# Patient Record
Sex: FEMALE | Race: BLACK OR AFRICAN AMERICAN | Employment: UNEMPLOYED | URBAN - METROPOLITAN AREA
[De-identification: names, ages, dates, MRNs, and addresses within clinical notes are randomized per-mention and may not be internally consistent; named-entity substitution may affect disease eponyms.]

---

## 2017-04-13 ENCOUNTER — HOSPITAL ENCOUNTER (OUTPATIENT)
Age: 37
Setting detail: OBSERVATION
Discharge: HOME | End: 2017-04-14
Attending: EMERGENCY MEDICINE | Admitting: HOSPITALIST
Payer: MEDICAID

## 2017-04-13 ENCOUNTER — APPOINTMENT (OUTPATIENT)
Dept: CT IMAGING | Age: 37
End: 2017-04-13
Attending: HOSPITALIST
Payer: MEDICAID

## 2017-04-13 ENCOUNTER — APPOINTMENT (OUTPATIENT)
Dept: GENERAL RADIOLOGY | Age: 37
End: 2017-04-13
Attending: EMERGENCY MEDICINE
Payer: MEDICAID

## 2017-04-13 DIAGNOSIS — R94.31 ABNORMAL ECG: ICD-10-CM

## 2017-04-13 DIAGNOSIS — R07.89 CHEST WALL PAIN: ICD-10-CM

## 2017-04-13 DIAGNOSIS — R07.9 CHEST PAIN, UNSPECIFIED TYPE: Primary | ICD-10-CM

## 2017-04-13 DIAGNOSIS — M79.662 BILATERAL CALF PAIN: ICD-10-CM

## 2017-04-13 DIAGNOSIS — M79.661 BILATERAL CALF PAIN: ICD-10-CM

## 2017-04-13 PROBLEM — E11.638: Status: ACTIVE | Noted: 2017-04-13

## 2017-04-13 PROBLEM — I10 HTN (HYPERTENSION): Status: ACTIVE | Noted: 2017-04-13

## 2017-04-13 PROBLEM — K21.9 ACID REFLUX DISEASE: Status: ACTIVE | Noted: 2017-04-13

## 2017-04-13 LAB
ALBUMIN SERPL BCP-MCNC: 3.7 G/DL (ref 3.4–5)
ALBUMIN/GLOB SERPL: 0.9 {RATIO} (ref 0.8–1.7)
ALP SERPL-CCNC: 73 U/L (ref 45–117)
ALT SERPL-CCNC: 36 U/L (ref 13–56)
ANION GAP BLD CALC-SCNC: 10 MMOL/L (ref 3–18)
AST SERPL W P-5'-P-CCNC: 20 U/L (ref 15–37)
BASOPHILS # BLD AUTO: 0 K/UL (ref 0–0.06)
BASOPHILS # BLD: 0 % (ref 0–2)
BILIRUB SERPL-MCNC: 0.4 MG/DL (ref 0.2–1)
BUN SERPL-MCNC: 6 MG/DL (ref 7–18)
BUN/CREAT SERPL: 7 (ref 12–20)
CALCIUM SERPL-MCNC: 9.7 MG/DL (ref 8.5–10.1)
CHLORIDE SERPL-SCNC: 101 MMOL/L (ref 100–108)
CK MB CFR SERPL CALC: NORMAL % (ref 0–4)
CK MB CFR SERPL CALC: NORMAL % (ref 0–4)
CK MB SERPL-MCNC: <1 NG/ML (ref 5–25)
CK MB SERPL-MCNC: <1 NG/ML (ref 5–25)
CK SERPL-CCNC: 103 U/L (ref 26–192)
CK SERPL-CCNC: 113 U/L (ref 26–192)
CO2 SERPL-SCNC: 29 MMOL/L (ref 21–32)
CREAT SERPL-MCNC: 0.81 MG/DL (ref 0.6–1.3)
D DIMER PPP FEU-MCNC: <0.27 UG/ML(FEU)
DIFFERENTIAL METHOD BLD: ABNORMAL
EOSINOPHIL # BLD: 0.3 K/UL (ref 0–0.4)
EOSINOPHIL NFR BLD: 3 % (ref 0–5)
ERYTHROCYTE [DISTWIDTH] IN BLOOD BY AUTOMATED COUNT: 14.2 % (ref 11.6–14.5)
EST. AVERAGE GLUCOSE BLD GHB EST-MCNC: 151 MG/DL
GLOBULIN SER CALC-MCNC: 4.3 G/DL (ref 2–4)
GLUCOSE BLD STRIP.AUTO-MCNC: 129 MG/DL (ref 70–110)
GLUCOSE BLD STRIP.AUTO-MCNC: 150 MG/DL (ref 70–110)
GLUCOSE SERPL-MCNC: 140 MG/DL (ref 74–99)
HBA1C MFR BLD: 6.9 % (ref 4.5–5.6)
HCG SERPL QL: NEGATIVE
HCT VFR BLD AUTO: 35.8 % (ref 35–45)
HGB BLD-MCNC: 12.1 G/DL (ref 12–16)
LIPASE SERPL-CCNC: 218 U/L (ref 73–393)
LYMPHOCYTES # BLD AUTO: 17 % (ref 21–52)
LYMPHOCYTES # BLD: 1.6 K/UL (ref 0.9–3.6)
MCH RBC QN AUTO: 28.1 PG (ref 24–34)
MCHC RBC AUTO-ENTMCNC: 33.8 G/DL (ref 31–37)
MCV RBC AUTO: 83.3 FL (ref 74–97)
MONOCYTES # BLD: 0.3 K/UL (ref 0.05–1.2)
MONOCYTES NFR BLD AUTO: 3 % (ref 3–10)
NEUTS SEG # BLD: 7.5 K/UL (ref 1.8–8)
NEUTS SEG NFR BLD AUTO: 77 % (ref 40–73)
PLATELET # BLD AUTO: 334 K/UL (ref 135–420)
PMV BLD AUTO: 8.9 FL (ref 9.2–11.8)
POTASSIUM SERPL-SCNC: 3.7 MMOL/L (ref 3.5–5.5)
PROT SERPL-MCNC: 8 G/DL (ref 6.4–8.2)
RBC # BLD AUTO: 4.3 M/UL (ref 4.2–5.3)
SODIUM SERPL-SCNC: 140 MMOL/L (ref 136–145)
TROPONIN I BLD-MCNC: <0.04 NG/ML (ref 0–0.08)
TROPONIN I SERPL-MCNC: <0.02 NG/ML (ref 0–0.06)
TROPONIN I SERPL-MCNC: <0.02 NG/ML (ref 0–0.06)
WBC # BLD AUTO: 9.8 K/UL (ref 4.6–13.2)

## 2017-04-13 PROCEDURE — 71020 XR CHEST PA LAT: CPT

## 2017-04-13 PROCEDURE — 99285 EMERGENCY DEPT VISIT HI MDM: CPT

## 2017-04-13 PROCEDURE — 82962 GLUCOSE BLOOD TEST: CPT

## 2017-04-13 PROCEDURE — 84703 CHORIONIC GONADOTROPIN ASSAY: CPT | Performed by: EMERGENCY MEDICINE

## 2017-04-13 PROCEDURE — 85379 FIBRIN DEGRADATION QUANT: CPT | Performed by: EMERGENCY MEDICINE

## 2017-04-13 PROCEDURE — 74011250637 HC RX REV CODE- 250/637: Performed by: EMERGENCY MEDICINE

## 2017-04-13 PROCEDURE — 74011250636 HC RX REV CODE- 250/636: Performed by: EMERGENCY MEDICINE

## 2017-04-13 PROCEDURE — 74011250637 HC RX REV CODE- 250/637: Performed by: HOSPITALIST

## 2017-04-13 PROCEDURE — 83690 ASSAY OF LIPASE: CPT | Performed by: EMERGENCY MEDICINE

## 2017-04-13 PROCEDURE — 83036 HEMOGLOBIN GLYCOSYLATED A1C: CPT | Performed by: HOSPITALIST

## 2017-04-13 PROCEDURE — 93970 EXTREMITY STUDY: CPT

## 2017-04-13 PROCEDURE — 93005 ELECTROCARDIOGRAM TRACING: CPT

## 2017-04-13 PROCEDURE — 82550 ASSAY OF CK (CPK): CPT | Performed by: EMERGENCY MEDICINE

## 2017-04-13 PROCEDURE — 85025 COMPLETE CBC W/AUTO DIFF WBC: CPT | Performed by: EMERGENCY MEDICINE

## 2017-04-13 PROCEDURE — 96374 THER/PROPH/DIAG INJ IV PUSH: CPT

## 2017-04-13 PROCEDURE — 96372 THER/PROPH/DIAG INJ SC/IM: CPT

## 2017-04-13 PROCEDURE — 74011250637 HC RX REV CODE- 250/637: Performed by: INTERNAL MEDICINE

## 2017-04-13 PROCEDURE — 96376 TX/PRO/DX INJ SAME DRUG ADON: CPT

## 2017-04-13 PROCEDURE — 96361 HYDRATE IV INFUSION ADD-ON: CPT

## 2017-04-13 PROCEDURE — 74011636320 HC RX REV CODE- 636/320: Performed by: EMERGENCY MEDICINE

## 2017-04-13 PROCEDURE — 84484 ASSAY OF TROPONIN QUANT: CPT | Performed by: EMERGENCY MEDICINE

## 2017-04-13 PROCEDURE — 99218 HC RM OBSERVATION: CPT

## 2017-04-13 PROCEDURE — 96360 HYDRATION IV INFUSION INIT: CPT

## 2017-04-13 PROCEDURE — 74011250636 HC RX REV CODE- 250/636: Performed by: HOSPITALIST

## 2017-04-13 PROCEDURE — 80053 COMPREHEN METABOLIC PANEL: CPT | Performed by: EMERGENCY MEDICINE

## 2017-04-13 PROCEDURE — 96375 TX/PRO/DX INJ NEW DRUG ADDON: CPT

## 2017-04-13 PROCEDURE — 71275 CT ANGIOGRAPHY CHEST: CPT

## 2017-04-13 PROCEDURE — 36415 COLL VENOUS BLD VENIPUNCTURE: CPT | Performed by: HOSPITALIST

## 2017-04-13 PROCEDURE — 93017 CV STRESS TEST TRACING ONLY: CPT | Performed by: HOSPITALIST

## 2017-04-13 RX ORDER — GLIPIZIDE 10 MG/1
10 TABLET ORAL 2 TIMES DAILY
COMMUNITY

## 2017-04-13 RX ORDER — HYDROCODONE BITARTRATE AND ACETAMINOPHEN 5; 325 MG/1; MG/1
1 TABLET ORAL
Status: COMPLETED | OUTPATIENT
Start: 2017-04-13 | End: 2017-04-13

## 2017-04-13 RX ORDER — PANTOPRAZOLE SODIUM 20 MG/1
20 TABLET, DELAYED RELEASE ORAL DAILY
COMMUNITY
End: 2017-06-16

## 2017-04-13 RX ORDER — MORPHINE SULFATE 2 MG/ML
1 INJECTION, SOLUTION INTRAMUSCULAR; INTRAVENOUS
Status: DISCONTINUED | OUTPATIENT
Start: 2017-04-13 | End: 2017-04-14 | Stop reason: HOSPADM

## 2017-04-13 RX ORDER — SODIUM CHLORIDE 0.9 % (FLUSH) 0.9 %
5-10 SYRINGE (ML) INJECTION AS NEEDED
Status: DISCONTINUED | OUTPATIENT
Start: 2017-04-13 | End: 2017-04-14 | Stop reason: HOSPADM

## 2017-04-13 RX ORDER — SODIUM CHLORIDE 9 MG/ML
75 INJECTION, SOLUTION INTRAVENOUS CONTINUOUS
Status: DISCONTINUED | OUTPATIENT
Start: 2017-04-13 | End: 2017-04-14 | Stop reason: HOSPADM

## 2017-04-13 RX ORDER — ACETAMINOPHEN 325 MG/1
650 TABLET ORAL ONCE
Status: COMPLETED | OUTPATIENT
Start: 2017-04-13 | End: 2017-04-13

## 2017-04-13 RX ORDER — DEXTROSE 50 % IN WATER (D50W) INTRAVENOUS SYRINGE
25-50 AS NEEDED
Status: DISCONTINUED | OUTPATIENT
Start: 2017-04-13 | End: 2017-04-14 | Stop reason: HOSPADM

## 2017-04-13 RX ORDER — LOSARTAN POTASSIUM 25 MG/1
25 TABLET ORAL DAILY
Status: DISCONTINUED | OUTPATIENT
Start: 2017-04-14 | End: 2017-04-14 | Stop reason: HOSPADM

## 2017-04-13 RX ORDER — MAGNESIUM SULFATE 100 %
4 CRYSTALS MISCELLANEOUS AS NEEDED
Status: DISCONTINUED | OUTPATIENT
Start: 2017-04-13 | End: 2017-04-14 | Stop reason: HOSPADM

## 2017-04-13 RX ORDER — NITROGLYCERIN 0.4 MG/1
0.4 TABLET SUBLINGUAL AS NEEDED
Status: DISCONTINUED | OUTPATIENT
Start: 2017-04-13 | End: 2017-04-14 | Stop reason: HOSPADM

## 2017-04-13 RX ORDER — INSULIN LISPRO 100 [IU]/ML
INJECTION, SOLUTION INTRAVENOUS; SUBCUTANEOUS
Status: DISCONTINUED | OUTPATIENT
Start: 2017-04-13 | End: 2017-04-14 | Stop reason: HOSPADM

## 2017-04-13 RX ORDER — GUAIFENESIN 100 MG/5ML
324 LIQUID (ML) ORAL
Status: COMPLETED | OUTPATIENT
Start: 2017-04-13 | End: 2017-04-13

## 2017-04-13 RX ORDER — DICYCLOMINE HYDROCHLORIDE 10 MG/1
10 CAPSULE ORAL
COMMUNITY
End: 2017-10-09

## 2017-04-13 RX ORDER — HYDROCODONE BITARTRATE AND ACETAMINOPHEN 5; 325 MG/1; MG/1
1 TABLET ORAL
Qty: 16 TAB | Refills: 0 | Status: SHIPPED | OUTPATIENT
Start: 2017-04-13 | End: 2017-10-09

## 2017-04-13 RX ORDER — SODIUM CHLORIDE 0.9 % (FLUSH) 0.9 %
5-10 SYRINGE (ML) INJECTION EVERY 8 HOURS
Status: DISCONTINUED | OUTPATIENT
Start: 2017-04-13 | End: 2017-04-14 | Stop reason: HOSPADM

## 2017-04-13 RX ORDER — METOPROLOL TARTRATE 25 MG/1
25 TABLET, FILM COATED ORAL 2 TIMES DAILY
Status: DISCONTINUED | OUTPATIENT
Start: 2017-04-14 | End: 2017-04-13

## 2017-04-13 RX ORDER — PANTOPRAZOLE SODIUM 40 MG/1
40 TABLET, DELAYED RELEASE ORAL
Status: DISCONTINUED | OUTPATIENT
Start: 2017-04-13 | End: 2017-04-14 | Stop reason: HOSPADM

## 2017-04-13 RX ORDER — METOPROLOL TARTRATE 25 MG/1
25 TABLET, FILM COATED ORAL 2 TIMES DAILY
Status: DISCONTINUED | OUTPATIENT
Start: 2017-04-13 | End: 2017-04-14 | Stop reason: HOSPADM

## 2017-04-13 RX ORDER — KETOROLAC TROMETHAMINE 30 MG/ML
30 INJECTION, SOLUTION INTRAMUSCULAR; INTRAVENOUS
Status: COMPLETED | OUTPATIENT
Start: 2017-04-13 | End: 2017-04-13

## 2017-04-13 RX ORDER — ENOXAPARIN SODIUM 150 MG/ML
140 INJECTION SUBCUTANEOUS EVERY 12 HOURS
Status: DISCONTINUED | OUTPATIENT
Start: 2017-04-13 | End: 2017-04-14

## 2017-04-13 RX ORDER — GUAIFENESIN 100 MG/5ML
81 LIQUID (ML) ORAL DAILY
Status: DISCONTINUED | OUTPATIENT
Start: 2017-04-14 | End: 2017-04-14 | Stop reason: HOSPADM

## 2017-04-13 RX ADMIN — Medication 1 MG: at 22:57

## 2017-04-13 RX ADMIN — SODIUM CHLORIDE 1000 ML: 900 INJECTION, SOLUTION INTRAVENOUS at 12:41

## 2017-04-13 RX ADMIN — PANTOPRAZOLE SODIUM 40 MG: 40 TABLET, DELAYED RELEASE ORAL at 18:32

## 2017-04-13 RX ADMIN — Medication 10 ML: at 21:13

## 2017-04-13 RX ADMIN — Medication 10 ML: at 14:00

## 2017-04-13 RX ADMIN — KETOROLAC TROMETHAMINE 30 MG: 30 INJECTION, SOLUTION INTRAMUSCULAR at 12:39

## 2017-04-13 RX ADMIN — ENOXAPARIN SODIUM 140 MG: 150 INJECTION SUBCUTANEOUS at 17:49

## 2017-04-13 RX ADMIN — ACETAMINOPHEN 650 MG: 325 TABLET, FILM COATED ORAL at 21:47

## 2017-04-13 RX ADMIN — ASPIRIN 324 MG: 81 TABLET, CHEWABLE ORAL at 16:40

## 2017-04-13 RX ADMIN — Medication 1 MG: at 19:04

## 2017-04-13 RX ADMIN — IOPAMIDOL 100 ML: 755 INJECTION, SOLUTION INTRAVENOUS at 18:21

## 2017-04-13 RX ADMIN — METOPROLOL TARTRATE 25 MG: 25 TABLET ORAL at 22:53

## 2017-04-13 RX ADMIN — HYDROCODONE BITARTRATE AND ACETAMINOPHEN 1 TABLET: 5; 325 TABLET ORAL at 14:36

## 2017-04-13 RX ADMIN — NITROGLYCERIN 1 INCH: 20 OINTMENT TOPICAL at 16:40

## 2017-04-13 RX ADMIN — SODIUM CHLORIDE 75 ML/HR: 900 INJECTION, SOLUTION INTRAVENOUS at 19:04

## 2017-04-13 NOTE — PROGRESS NOTES
1845 Pt arrived on unit from the ED via stretcher. Pt is alert and oriented x4, denies chest pain/SOB, VSS. Pt has been oriented to the room, bed is in the low and locked position, call bell has been left within reach, and pt denies any further needs at this time.

## 2017-04-13 NOTE — ROUTINE PROCESS
Bedside and Verbal shift change report given to MICH Rai (oncoming nurse) by Sis Olivo   (offgoing nurse).  Report included the following information SBAR, Kardex, ED Summary, Procedure Summary, Intake/Output, MAR, Recent Results and Cardiac Rhythm SR.

## 2017-04-13 NOTE — ED PROVIDER NOTES
[address]  EMERGENCY DEPARTMENT HISTORY AND PHYSICAL EXAM       Date: 4/13/2017   Patient Name: Bart Coleman   YOB: 1980  Medical Record Number: [id number]    History of Presenting Illness     Chief Complaint   Patient presents with    Chest Pain    Shortness of Breath    Leg Pain        History Provided By:  Patient    Additional History:   12:10 PM  Bart Coleman is a 31 y.o. female H x DM and HTN who presents to the emergency department C/O central chest pain onset ~4 days ago. Pain rated 10/10. Pt reports it is a constant \"throbbing\" pain and that the pain is exacerbated by movement. It does not radiate anywhere else. Associated Sx include SOB, bilateral calf tenderness, and cough. Denies recent travels, Etoh use, or tobacco use. Denies abdominal pain, ear pain, eye pain, sore throat, and any other Sx or complaints at this time. LMP 3/29/17. Primary Care Provider: None   Specialist:    Past History     Past Medical History:   Past Medical History:   Diagnosis Date    Diabetes ([address]     GERD (gastroesophageal reflux disease)     High cholesterol     Pancreatitis         Past Surgical History:   Past Surgical History:   Procedure Laterality Date    HX APPENDECTOMY      HX CHOLECYSTECTOMY          Family History:   History reviewed. No pertinent family history. Social History:   Social History   Substance Use Topics    Smoking status: Never Smoker    Smokeless tobacco: None    Alcohol use No        Allergies: Allergies   Allergen Reactions    Effexor [Venlafaxine] Rash        Review of Systems   Review of Systems   Constitutional: Negative for fatigue and fever. HENT: Negative for ear pain, rhinorrhea and sore throat. Eyes: Negative for pain. Respiratory: Positive for cough and shortness of breath. Cardiovascular: Positive for chest pain. Negative for palpitations.    Gastrointestinal: Negative for abdominal pain, diarrhea, nausea and vomiting. Genitourinary: Negative for difficulty urinating and dysuria. Musculoskeletal: Negative for arthralgias and myalgias. Bilateral calf tenderness   Skin: Negative for color change and rash. Neurological: Negative for light-headedness and headaches. All other systems reviewed and are negative. Physical Exam  Vitals:    04/13/17 1036 04/13/17 1158 04/13/17 1346 04/13/17 1645   BP: 152/90  135/67 (!) 152/96   Pulse: 96  89 96   Resp: 14  16 15   Temp: 98.4 °F (36.9 °C)  98.7 °F (37.1 °C) 98.5 °F (36.9 °C)   SpO2: 100% 100% 95% 100%   Weight: 134.7 kg (297 lb)      Height: 5' 7\" (1.702 m)          Physical Exam   Constitutional: She is oriented to person, place, and time. Morbidly obese. HENT:   Head: Normocephalic and atraumatic. Right Ear: External ear normal.   Left Ear: External ear normal.   Nose: Nose normal.   Mouth/Throat: Oropharynx is clear and moist.   Eyes: Conjunctivae and EOM are normal. Pupils are equal, round, and reactive to light. Neck: Normal range of motion. Neck supple. No JVD present. No tracheal deviation present. Cardiovascular: Normal rate, regular rhythm, normal heart sounds and intact distal pulses. Exam reveals no gallop and no friction rub. No murmur heard. Anterior chest wall tenderness    Pulmonary/Chest: Effort normal and breath sounds normal. No respiratory distress. She has no wheezes. She has no rales. She exhibits tenderness. Abdominal: Soft. Bowel sounds are normal. She exhibits no distension and no mass. There is no tenderness. There is no rebound and no guarding. Musculoskeletal: Normal range of motion. She exhibits tenderness. She exhibits no edema. Right lower leg: She exhibits tenderness. Left lower leg: She exhibits tenderness. Neurological: She is alert and oriented to person, place, and time. She has normal reflexes. No cranial nerve deficit. Skin: Skin is warm and dry. No rash noted.    Psychiatric: She has a normal mood and affect. Her behavior is normal.   Nursing note and vitals reviewed. Diagnostic Study Results     Labs -      Recent Results (from the past 12 hour(s))   EKG, 12 LEAD, INITIAL    Collection Time: 04/13/17 11:06 AM   Result Value Ref Range    Ventricular Rate 103 BPM    Atrial Rate 103 BPM    P-R Interval 140 ms    QRS Duration 74 ms    Q-T Interval 376 ms    QTC Calculation (Bezet) 492 ms    Calculated P Axis 54 degrees    Calculated R Axis 34 degrees    Calculated T Axis -14 degrees    Diagnosis       Sinus tachycardia  T wave abnormality, consider anterolateral ischemia  Abnormal ECG  No previous ECGs available     CBC WITH AUTOMATED DIFF    Collection Time: 04/13/17 12:09 PM   Result Value Ref Range    WBC 9.8 4.6 - 13.2 K/uL    RBC 4.30 4.20 - 5.30 M/uL    HGB 12.1 12.0 - 16.0 g/dL    HCT 35.8 35.0 - 45.0 %    MCV 83.3 74.0 - 97.0 FL    MCH 28.1 24.0 - 34.0 PG    MCHC 33.8 31.0 - 37.0 g/dL    RDW 14.2 11.6 - 14.5 %    PLATELET 527 703 - 341 K/uL    MPV 8.9 (L) 9.2 - 11.8 FL    NEUTROPHILS 77 (H) 40 - 73 %    LYMPHOCYTES 17 (L) 21 - 52 %    MONOCYTES 3 3 - 10 %    EOSINOPHILS 3 0 - 5 %    BASOPHILS 0 0 - 2 %    ABS. NEUTROPHILS 7.5 1.8 - 8.0 K/UL    ABS. LYMPHOCYTES 1.6 0.9 - 3.6 K/UL    ABS. MONOCYTES 0.3 0.05 - 1.2 K/UL    ABS. EOSINOPHILS 0.3 0.0 - 0.4 K/UL    ABS.  BASOPHILS 0.0 0.0 - 0.06 K/UL    DF AUTOMATED     METABOLIC PANEL, COMPREHENSIVE    Collection Time: 04/13/17 12:09 PM   Result Value Ref Range    Sodium 140 136 - 145 mmol/L    Potassium 3.7 3.5 - 5.5 mmol/L    Chloride 101 100 - 108 mmol/L    CO2 29 21 - 32 mmol/L    Anion gap 10 3.0 - 18 mmol/L    Glucose 140 (H) 74 - 99 mg/dL    BUN 6 (L) 7.0 - 18 MG/DL    Creatinine 0.81 0.6 - 1.3 MG/DL    BUN/Creatinine ratio 7 (L) 12 - 20      GFR est AA >60 >60 ml/min/1.73m2    GFR est non-AA >60 >60 ml/min/1.73m2    Calcium 9.7 8.5 - 10.1 MG/DL    Bilirubin, total 0.4 0.2 - 1.0 MG/DL    ALT (SGPT) 36 13 - 56 U/L    AST (SGOT) 20 15 - 37 U/L    Alk. phosphatase 73 45 - 117 U/L    Protein, total 8.0 6.4 - 8.2 g/dL    Albumin 3.7 3.4 - 5.0 g/dL    Globulin 4.3 (H) 2.0 - 4.0 g/dL    A-G Ratio 0.9 0.8 - 1.7     HCG QL SERUM    Collection Time: 04/13/17 12:09 PM   Result Value Ref Range    HCG, Ql. NEGATIVE  NEG     D DIMER    Collection Time: 04/13/17 12:09 PM   Result Value Ref Range    D DIMER <0.27 <0.46 ug/ml(FEU)   CARDIAC PANEL,(CK, CKMB & TROPONIN)    Collection Time: 04/13/17 12:09 PM   Result Value Ref Range     26 - 192 U/L    CK - MB <1.0 <3.6 ng/ml    CK-MB Index Cannot be calulated 0.0 - 4.0 %    Troponin-I, Qt. <0.02 0.00 - 0.06 NG/ML   LIPASE    Collection Time: 04/13/17 12:09 PM   Result Value Ref Range    Lipase 218 73 - 393 U/L   POC TROPONIN-I    Collection Time: 04/13/17  3:27 PM   Result Value Ref Range    Troponin-I (POC) <0.04 0.00 - 0.08 ng/mL   EKG, 12 LEAD, INITIAL    Collection Time: 04/13/17  3:31 PM   Result Value Ref Range    Ventricular Rate 91 BPM    Atrial Rate 91 BPM    P-R Interval 140 ms    QRS Duration 86 ms    Q-T Interval 438 ms    QTC Calculation (Bezet) 538 ms    Calculated P Axis 46 degrees    Calculated R Axis 28 degrees    Calculated T Axis -11 degrees    Diagnosis       Normal sinus rhythm  Nonspecific T wave abnormality  Prolonged QT  Abnormal ECG  When compared with ECG of 13-APR-2017 11:06,  Nonspecific T wave abnormality has replaced inverted T waves in Anterior   leads         Radiologic Studies -  The following have been ordered and reviewed:  DUPLEX LOWER EXT VENOUS BILAT:  No evidence of bilateral lower extremity deep venous thrombosis or  significant venous valvular incompetence  As read by the radiologist.         XR CHEST PA LAT   Final Result:  No significant abnormality  As read by the radiologist.          Medical Decision Making   I am the first provider for this patient.      I reviewed the vital signs, available nursing notes, past medical history, past surgical history, family history and social history. INITIAL CLINICAL IMPRESSION and PLANS:  DDx: PE, DVT, ACS, CHF, chest wall pain, biliary colic, pancreatitis     Considering the above, my initial management plan to evaluate and therapeutic interventions include the following and as noted in the orders:    1. Labs: CBC, CMP, UA, HCG QL, D-Dimer, cardiac panel, lipase,   2. Imaging: EKG, CXR      Vital Signs-Reviewed the patient's vital signs. Patient Vitals for the past 12 hrs:   Temp Pulse Resp BP SpO2   04/13/17 1645 98.5 °F (36.9 °C) 96 15 (!) 152/96 100 %   04/13/17 1346 98.7 °F (37.1 °C) 89 16 135/67 95 %   04/13/17 1158 - - - - 100 %   04/13/17 1036 98.4 °F (36.9 °C) 96 14 152/90 100 %       Pulse Oximetry Analysis - Normal 100% on room air     Cardiac Monitor:   Rate: 95 bpm  Rhythm: SV Rhythm     EKG interpretation: (Preliminary)  Sinus tachycardia. Rate 103 bpm. T wave abnormality, consider anterolateral ischemia. EKG read by Chelsea Brice MD at 11:06 AM     EKG interpretation: (Secondary)  NSR. Rate 91 bpm. No specific T wave changes. EKG read by Lucian Mendez MD at 3:31 PM       Procedures:   Procedures    ED Course: The patient had persistent chest pain in the ED. ECG showed T wave changes. Serial troponins were normal. D-dimer normal, doubt PE. BLE PVL showed no DVT. Labs remarkable for hyperglycemia. The patient's chest pain resolved for 45 minutes after dose of Norco, but returned. Case discussed with Cardiology who recommends admission for further evaluation. Case discussed with hospitalist who agrees with admission. 12:10 PM  Initial assessment performed. The patients presenting problems have been discussed, and they are in agreement with the care plan formulated and outlined with them. I have encouraged them to ask questions as they arise throughout their visit.       PROGRESS NOTE:   4:25 PM  Pt has been re-examined by Hilda Borrego MD. She was chest pain free from 3:30 to 4:15PM but the pain is now coming back. Written by Mitesh Randall, ED Scribe, as dictated by Shanice Mena MD.    Medications Given in the ED:  Medications   sodium chloride (NS) flush 5-10 mL (10 mL IntraVENous Given 4/13/17 1400)   sodium chloride (NS) flush 5-10 mL (not administered)   GI COCKTAIL [facility]) (30 mL Oral Refused 4/13/17 1453)   sodium chloride 0.9 % bolus infusion 1,000 mL (0 mL IntraVENous IV Completed 4/13/17 1533)   ketorolac (TORADOL) injection 30 mg (30 mg IntraVENous Given 4/13/17 1239)   HYDROcodone-acetaminophen (NORCO) 5-325 mg per tablet 1 Tab (1 Tab Oral Given 4/13/17 1436)   aspirin chewable tablet 324 mg (324 mg Oral Given 4/13/17 1640)   nitroglycerin (NITROBID) 2 % ointment 1 Inch (1 Inch Topical Given 4/13/17 1640)     CONSULT NOTE:   4:33 PM  Shanice Mena MD spoke with Ese Rosales MD   Specialty: Hospitalist  Discussed pt's hx, disposition, and available diagnostic and imaging results. Reviewed care plans. Consulting physician agrees with plans as outlined. She will admit the patient to Telemetry. Written by Mitesh Randall, ED Scribe, as dictated by Shanice Mena MD.     4:34 PM  Patient is being admitted to the hospital by Ese Rosales MD. The results of their tests and reasons for their admission have been discussed with them and/or available family. They convey agreement and understanding for the need to be admitted and for their admission diagnosis. CONDITIONS ON ADMISSION:  Deep Vein Thrombosis is not present at the time of admission. Thrombosis is not present at the time of admission. Urinary Tract Infection is not present at the time of admission. Pneumonia is not present at the time of admission. MRSA is not present at the time of admission. Wound infection is not present at the time of admission. Pressure Ulcer is not present at the time of admission. Diagnosis   Clinical Impression:   1. Chest pain, unspecified type    2. Chest wall pain    3. Bilateral calf pain    4.  Abnormal ECG         _______________________________   Attestations: This note is prepared by Marisol Mcgee, acting as a Scribe for Lucian Mendez MD on 12:07 PM on 4/13/2017 . Lucian Mendez MD: The scribe's documentation has been prepared under my direction and personally reviewed by me in its entirety.   _______________________________

## 2017-04-13 NOTE — ED TRIAGE NOTES
I think I might have chest pain, and shortness of breath. Have pain and swelling in my calf of both of my legs. i think I might have blood clots in my chest and in my legs. Sepsis Screening completed    (  )Patient meets SIRS criteria. (x  )Patient does not meet SIRS criteria.       SIRS Criteria is achieved when two or more of the following are present   Temperature < 96.8°F (36°C) or > 100.9°F (38.3°C)   Heart Rate > 90 beats per minute   Respiratory Rate > 20 breaths per minute   WBC count > 12,000 or <4,000 or > 10% bands

## 2017-04-13 NOTE — CONSULTS
[address] [place] [address] Rd    Name:  Jag Gonzalez  MR#:  [id number]  :  1980  Account #:  [de-identified]  Date of Adm:  2017  Date of Consultation:  2017      REASON FOR CONSULTATION: Chest pain. HISTORY OF PRESENT ILLNESS: The patient is a 19-year-old very  pleasant female with morbid obesity, diabetes, hypertension,  gastroesophageal reflux disease, came to the hospital with on and off  chest pain started from last , becomes more during the  exertion, throbbing pain in character, without any palpitation, sometimes  feels better on bending forward, become worse on exertion and lying. She had similar kind of symptoms in the past and she reported she  had a stress test done in the past that was normal. Denied any fever,  cough, or pleuritic chest pain. Denied any new swelling in the lower  extremities and denied any previous history of heart problem. There is  no history of premature coronary artery disease in the family as well. PAST MEDICAL HISTORY:  1. Diabetes. 2. Obesity. 3. Hypertension. 4. Pancreatitis history. 5. Dyslipidemia. SURGICAL HISTORY: Significant for appendectomy and  cholecystectomy. MEDICATIONS: Reviewed and discussed with the patient. ALLERGIES: THE PATIENT IS ALLERGIC TO VENLAFAXINE. REVIEW OF SYSTEMS  A 10-point review of systems completed. Positive pertinent findings  discussed in history of present illness. The rest of the systems are  negative. PHYSICAL EXAMINATION  GENERAL: At the time of consultation: Afebrile, temperature is 98.5,  pulse rate is 96, heart rate is 15, blood pressure is 152/96, pulse  oximetry 100%. HEENT: Head is atraumatic, normocephalic. NECK: Supple. No JVD, no carotid bruits. HEART: S1, S2 audible. LUNGS: Bilateral air entry positive, no added sound. ABDOMEN: Soft, nontender. Bowel sounds audible. EXTREMITIES: No edema. Pulses are palpable.   NEUROLOGIC: No focal motor or sensory deficit. DERMATOLOGIC: No skin rash. LABORATORY DATA: EKG has shown minimal ST depression with T-  wave inversion in V3 to V6, and also in V3 and V5 as well. The repeat  EKG has shown a little improvement in the T-wave inversion in the  anterior leads. Hemoglobin is 12.1, platelet count is 935. Sodium 140, potassium 3.7,  troponin is 0.02 and CK-MB is normal.    ASSESSMENT AND PLAN:  1. Chest pain with risk factors. 2. Mild dynamic T-wave changes. 3. Diabetes. 4. Hypertension. 5. Morbid obesity. Plan is to do the echocardiogram and a stress test tomorrow. Continue  the risk factor management. Treatment plan was discussed with the  patient and also with Dr. Flaquito Hutson as well.         Camacho Crowley MD MA / Ulysses Dress  D:  04/13/2017   18:18  T:  04/13/2017   18:41  Job #:  [id number]

## 2017-04-13 NOTE — PROCEDURES
[facility]  *** FINAL REPORT ***    Name: Julia Jade  MRN: [id number]    Outpatient  : 1980  HIS Order #: [id number]  [address] [facility] Visit #: [id number]  Date: 2017    TYPE OF TEST: Peripheral Venous Testing    REASON FOR TEST  Limb swelling    Right Leg:-  Deep venous thrombosis:           No  Superficial venous thrombosis:    No  Deep venous insufficiency:        Not examined  Superficial venous insufficiency: Not examined    Left Leg:-  Deep venous thrombosis:           No  Superficial venous thrombosis:    No  Deep venous insufficiency:        Not examined  Superficial venous insufficiency: Not examined      INTERPRETATION/FINDINGS  Duplex images were obtained using 2-D gray scale, color flow, and  spectral Doppler analysis. Bilateral lower extremity venous Doppler exam revealed patent vessels  with normal spontaneity and phasicity of signals with normal  augmentation. Deep venous valves appear competent. Duplex imaging revealed no intraluminal thrombus of the lower  extremities. IMPRESSION:  No evidence of bilateral lower extremity deep venous thrombosis or  significant venous valvular incompetence. ADDITIONAL COMMENTS    I have personally reviewed the data relevant to the interpretation of  this  study. TECHNOLOGIST: Xavier Sims  Signed: 2017 03:14 PM    PHYSICIAN: Christopher Salazar.  Anshu Cazares MD  Signed: 2017 11:03 AM

## 2017-04-13 NOTE — IP AVS SNAPSHOT
Greg Whitlock 
 
 
 [address] [facility] [address] 
[phone] Patient: Amy Hicks MRN: [id number] :1980 You are allergic to the following Allergen Reactions Effexor (Venlafaxine) Rash Recent Documentation Height Weight BMI OB Status Smoking Status 1.702 m 135.2 kg 46.67 kg/m2 Having regular periods Never Smoker Emergency Contacts Name Discharge Info Relation Home Work Mobile Humphrey Rojas DISCHARGE CAREGIVER [3] Spouse [3] [phone] About your hospitalization You were admitted on:  2017 You last received care in the:  [address] [place] [address] You were discharged on:  2017 Unit phone number:  [phone] Why you were hospitalized Your primary diagnosis was:  Chest Pain Your diagnoses also included:  Bilateral Calf Pain, Controlled Type 2 Diabetes Mellitus With Oral Complication (Hcc), Acid Reflux Disease, Htn (Hypertension) Providers Seen During Your Hospitalizations Provider Role Specialty Primary office phone Deven Rivers MD Attending Provider Emergency Medicine [phone] Yazan Vick MD Attending Provider Internal Medicine [phone] Your Primary Care Physician (PCP) Primary Care Physician Office Phone Office Fax Shyann Lee [phone] Follow-up Information Follow up With Details Comments Contact Info Ld Green DO Schedule an appointment as soon as possible for a visit in 1 day For further evaluation [address] [place] [address] [address] [place] [address] 
[phone] Stacia Garcia MD Schedule an appointment as soon as possible for a visit in 1 day For further evaluation of your chest pain [address] [facility] [address] [facility] [address] 
[phone] [facility] EMERGENCY DEPT  As needed, If symptoms worsen [address] [address] [phone] Carmen Chau MD In 1 week  [address] [address] 
[phone] Current Discharge Medication List  
  
START taking these medications Dose & Instructions Dispensing Information Comments Morning Noon Evening Bedtime  
 aspirin 81 mg chewable tablet Your next dose is:  04/15/17 Dose:  81 mg Take 1 Tab by mouth daily. Quantity:  30 Tab Refills:  0  
     
   
   
   
  
 metoprolol tartrate 25 mg tablet Commonly known as:  LOPRESSOR Your next dose is:  04/14/17 Dose:  25 mg Take 1 Tab by mouth two (2) times a day. Quantity:  60 Tab Refills:  0 CONTINUE these medications which have CHANGED Dose & Instructions Dispensing Information Comments Morning Noon Evening Bedtime HYDROcodone-acetaminophen 5-325 mg per tablet Commonly known as:  Viridiana Ly What changed:   
- how much to take - when to take this 
- reasons to take this Dose:  1 Tab Take 1 Tab by mouth every six (6) hours as needed for Pain. Max Daily Amount: 4 Tabs. Quantity:  16 Tab Refills:  0  
     
   
   
   
  
 losartan 25 mg tablet Commonly known as:  COZAAR What changed:   
- medication strength 
- how much to take - when to take this Your next dose is:  04/15/16 Dose:  25 mg Take 1 Tab by mouth daily. Quantity:  30 Tab Refills:  0 CONTINUE these medications which have NOT CHANGED Dose & Instructions Dispensing Information Comments Morning Noon Evening Bedtime BENTYL 10 mg capsule Generic drug:  dicyclomine Your next dose is:  04/14/17 Dose:  10 mg Take 10 mg by mouth 4 times daily (before meals and nightly). Refills:  0  
     
   
   
   
  
 glipiZIDE 10 mg tablet Commonly known as:  Patti Looney Your next dose is:  04/14/17 Dose:  10 mg Take 10 mg by mouth two (2) times a day. Refills:  0 MIRALAX 17 gram packet Generic drug:  polyethylene glycol Dose:  17 g Take 17 g by mouth daily. Refills:  0  
     
   
   
   
  
 pantoprazole 20 mg tablet Commonly known as:  PROTONIX Your next dose is:  04/15/17 Dose:  20 mg Take 20 mg by mouth daily. Refills:  0 PHENERGAN PO Take  by mouth. Refills:  0 PREVACID PO Your next dose is:  04/15/17 Take  by mouth. Refills:  0 STOP taking these medications PERCOCET 5-325 mg per tablet Generic drug:  oxyCODONE-acetaminophen Where to Get Your Medications Information on where to get these meds will be given to you by the nurse or doctor. ! Ask your nurse or doctor about these medications  
  aspirin 81 mg chewable tablet HYDROcodone-acetaminophen 5-325 mg per tablet  
 losartan 25 mg tablet  
 metoprolol tartrate 25 mg tablet Discharge Instructions Chest Pain: Care Instructions Your Care Instructions There are many things that can cause chest pain. Some are not serious and will get better on their own in a few days. But some kinds of chest pain need more testing and treatment. Your doctor may have recommended a follow-up visit in the next 8 to 12 hours. If you are not getting better, you may need more tests or treatment. Even though your doctor has released you, you still need to watch for any problems. The doctor carefully checked you, but sometimes problems can develop later. If you have new symptoms or if your symptoms do not get better, get medical care right away. If you have worse or different chest pain or pressure that lasts more than 5 minutes or you passed out (lost consciousness), call 911 or seek other emergency help right away. A medical visit is only one step in your treatment.  Even if you feel better, you still need to do what your doctor recommends, such as going to all suggested follow-up appointments and taking medicines exactly as directed. This will help you recover and help prevent future problems. How can you care for yourself at home? · Rest until you feel better. · Take your medicine exactly as prescribed. Call your doctor if you think you are having a problem with your medicine. · Do not drive after taking a prescription pain medicine. When should you call for help? Call 911 if: 
· You passed out (lost consciousness). · You have severe difficulty breathing. · You have symptoms of a heart attack. These may include: ¨ Chest pain or pressure, or a strange feeling in your chest. 
¨ Sweating. ¨ Shortness of breath. ¨ Nausea or vomiting. ¨ Pain, pressure, or a strange feeling in your back, neck, jaw, or upper belly or in one or both shoulders or arms. ¨ Lightheadedness or sudden weakness. ¨ A fast or irregular heartbeat. After you call 911, the  may tell you to chew 1 adult-strength or 2 to 4 low-dose aspirin. Wait for an ambulance. Do not try to drive yourself. Call your doctor today if: 
· You have any trouble breathing. · Your chest pain gets worse. · You are dizzy or lightheaded, or you feel like you may faint. · You are not getting better as expected. · You are having new or different chest pain. Where can you learn more? Go to http://bambi-carmelina.info/. Enter A120 in the search box to learn more about \"Chest Pain: Care Instructions. \" Current as of: May 27, 2016 Content Version: 11.2 © 0730-9599 [employer]. Care instructions adapted under license by [employer] (which disclaims liability or warranty for this information). If you have questions about a medical condition or this instruction, always ask your healthcare professional. [place] [address] any warranty or liability for your use of this information. DISCHARGE SUMMARY from Nurse The following personal items are in your possession at time of discharge: 
 
  
Visual Aid: Glasses, With patient Clothing: Footwear, Pants, Shirt (purse, flip flops) PATIENT INSTRUCTIONS: 
 
 
F-face looks uneven A-arms unable to move or move unevenly S-speech slurred or non-existent T-time-call 911 as soon as signs and symptoms begin-DO NOT go Back to bed or wait to see if you get better-TIME IS BRAIN. Warning Signs of HEART ATTACK Call 911 if you have these symptoms: 
? Chest discomfort. Most heart attacks involve discomfort in the center of the chest that lasts more than a few minutes, or that goes away and comes back. It can feel like uncomfortable pressure, squeezing, fullness, or pain. ? Discomfort in other areas of the upper body. Symptoms can include pain or discomfort in one or both arms, the back, neck, jaw, or stomach. ? Shortness of breath with or without chest discomfort. ? Other signs may include breaking out in a cold sweat, nausea, or lightheadedness. Don't wait more than five minutes to call [address]! Fast action can save your life. Calling 911 is almost always the fastest way to get lifesaving treatment. Emergency Medical Services staff can begin treatment when they arrive  up to an hour sooner than if someone gets to the hospital by car. The discharge information has been reviewed with the patient. The patient verbalized understanding. Discharge medications reviewed with the patient and appropriate educational materials and side effects teaching were provided. Patient armband removed and shredded Discharge Orders None MyChart Announcement  We are excited to announce that we are making your provider's discharge notes available to you in Lopoly. You will see these notes when they are completed and signed by the physician that discharged you from your recent hospital stay. If you have any questions or concerns about any information you see in Lopoly, please call the Health Information Department where you were seen or reach out to your Primary Care Provider for more information about your plan of care. Introducing [facility] SERVICES! Stephanie John introduces Lopoly patient portal. Now you can access parts of your medical record, email your doctor's office, and request medication refills online. 1. In your internet browser, go to [email or web address] 2. Click on the First Time User? Click Here link in the Sign In box. You will see the New Member Sign Up page. 3. Enter your Lopoly Access Code exactly as it appears below. You will not need to use this code after youve completed the sign-up process. If you do not sign up before the expiration date, you must request a new code. · Lopoly Access Code: [email or web address] Expires: 7/12/2017 11:18 AM 
 
4. Enter the last four digits of your Social Security Number (xxxx) and Date of Birth (mm/dd/yyyy) as indicated and click Submit. You will be taken to the next sign-up page. 5. Create a AfterColleget ID. This will be your Lopoly login ID and cannot be changed, so think of one that is secure and easy to remember. 6. Create a Lopoly password. You can change your password at any time. 7. Enter your Password Reset Question and Answer. This can be used at a later time if you forget your password. 8. Enter your e-mail address. You will receive e-mail notification when new information is available in [address] 9. Click Sign Up. You can now view and download portions of your medical record. 10. Click the Download Summary menu link to download a portable copy of your medical information. If you have questions, please visit the Frequently Asked Questions section of the MyChart website. Remember, MyChart is NOT to be used for urgent needs. For medical emergencies, dial 911. Now available from your iPhone and Android! General Information Please provide this summary of care documentation to your next provider. Patient Signature:  ____________________________________________________________ Date:  ____________________________________________________________  
  
Lajuanda Erika Provider Signature:  ____________________________________________________________ Date:  ____________________________________________________________

## 2017-04-13 NOTE — H&P
History & Physical    Patient: Elizabeth Allen MRN: [id number]  CSN: [id number]    YOB: 1980  Age: 31 y.o. Sex: female      DOA: 4/13/2017  Primary Care Provider:  None      Assessment/Plan     Patient Active Problem List   Diagnosis Code    Chest pain R07.9    Bilateral calf pain M79.661, M79.662    Controlled type 2 diabetes mellitus with oral complication (HCC) V73.951    Acid reflux disease K21.9    HTN (hypertension) I10       Admit to tele for obs     1. Chest pain ,   Will have cta to r/O PE, ce trend to r/o acs.  echo , morphine , nitro, lovenox therapeutic dose till r/o above . Stress test   Gi cocktail in ed,   uds   2. gerd    ppi ,      3 DM type II ,    ssi, diabetic diet , hypoglycemia protocol      4 HTN, accelerated  Continue home medication. 5 morbid obesity      Full code     DVT : lovenox. ppi proph  CC: chest pain        HPI:     Elizabeth Allen is a 31 y.o. female who hx of  DM , HTN ,gerd came to ed due to chest pain since last Saturday-on and off, epigastric area radiated to left chest. Associated sob. No palpitation. Throbbing pain, sitting straight better, lying and exertion worsening. Had similar episodes several years ago with negative stress test.  Also reported pain in bilateral calf pain since Saturday. ekg changed     Denies any slurred speech/headache/n/v/blurred vission/d/c/palpitation/gait change/bleeding. Denies smoking/ any alcohol or drug use. She was given aspirin and nitro paste in ED. Pt still has on and off chest pain when she was seen in ED.    Visit Vitals    BP (!) 152/96 (BP 1 Location: Right arm, BP Patient Position: At rest)    Pulse 96    Temp 98.5 °F (36.9 °C)    Resp 15    Ht 5' 7\" (1.702 m)    Wt 134.7 kg (297 lb)    LMP 03/29/2017    SpO2 100%    BMI 46.52 kg/m2      O2 Device: Room air      Past Medical History:   Diagnosis Date    Diabetes ([address]     GERD (gastroesophageal reflux disease)     High cholesterol     Pancreatitis        Past Surgical History:   Procedure Laterality Date    HX APPENDECTOMY      HX CHOLECYSTECTOMY         History reviewed. No pertinent family history. Social History     Social History    Marital status:      Spouse name: N/A    Number of children: N/A    Years of education: N/A     Social History Main Topics    Smoking status: Never Smoker    Smokeless tobacco: None    Alcohol use No    Drug use: None    Sexual activity: Not Asked     Other Topics Concern    None     Social History Narrative       Prior to Admission medications    Medication Sig Start Date End Date Taking? Authorizing Provider   glipiZIDE (GLUCOTROL) 10 mg tablet Take 10 mg by mouth two (2) times a day. Yes Shaan St MD   LOSARTAN PO Take  by mouth. Yes Shaan St MD   pantoprazole (PROTONIX) 20 mg tablet Take 20 mg by mouth daily. Yes Shaan St MD   dicyclomine (BENTYL) 10 mg capsule Take 10 mg by mouth 4 times daily (before meals and nightly). Yes Shaan St MD   HYDROcodone-acetaminophen (NORCO) 5-325 mg per tablet Take 1 Tab by mouth every six (6) hours as needed for Pain. Max Daily Amount: 4 Tabs. 4/13/17  Yes Balbir Nava MD   LANSOPRAZOLE (PREVACID PO) Take  by mouth. Shaan St MD   PROMETHAZINE HCL (PHENERGAN PO) Take  by mouth. Shaan St MD   oxycodone-acetaminophen (PERCOCET) 5-325 mg per tablet Take 1 tablet by mouth every four (4) hours as needed for Pain. Shaan St MD   hydrocodone-acetaminophen (NORCO) 5-325 mg per tablet Take  by mouth. Shaan St MD   polyethylene glycol (MIRALAX) 17 gram packet Take 17 g by mouth daily. Shaan St MD       Allergies   Allergen Reactions    Effexor [Venlafaxine] Rash       Review of Systems  Gen: No fever, chills, malaise, weight loss/gain. Heent: No headache, rhinorrhea, epistaxis, ear pain, hearing loss, sinus pain, neck pain/stiffness, sore throat. Heart: chest pain, no  palpitations, SLOAN, pnd, or orthopnea. Resp: No cough, hemoptysis, wheezing , + shortness of breath. GI: No nausea, vomiting, diarrhea, constipation, melena or hematochezia. : No urinary obstruction, dysuria or hematuria. Derm: No rash, new skin lesion or pruritis. Musc/skeletal: no bone or joint complains. Bilateral calf pain and leg swelling. Vasc: No edema, cyanosis or claudication. Endo: No heat/cold intolerance, no polyuria,polydipsia or polyphagia. Neuro: No unilateral weakness, numbness, tingling. No seizures. Heme: No easy bruising or bleeding. Physical Exam:     Physical Exam:  Visit Vitals    BP (!) 152/96 (BP 1 Location: Right arm, BP Patient Position: At rest)    Pulse 96    Temp 98.5 °F (36.9 °C)    Resp 15    Ht 5' 7\" (1.702 m)    Wt 134.7 kg (297 lb)    LMP 2017    SpO2 100%    BMI 46.52 kg/m2      O2 Device: Room air    Temp (24hrs), Av.5 °F (36.9 °C), Min:98.4 °F (36.9 °C), Max:98.7 °F (37.1 °C)             General:  Awake, cooperative, no distress. Head:  Normocephalic, without obvious abnormality, atraumatic. Eyes:  Conjunctivae/corneas clear, sclera anicteric, PERRL, EOMs intact. Nose: Nares normal. No drainage or sinus tenderness. Throat: Lips, mucosa, and tongue normal. .   Neck: Supple, symmetrical, trachea midline, no adenopathy. Lungs:   Clear to auscultation bilaterally. Tenderness of left chest wall        Heart:  Regular rate and rhythm, S1, S2 normal, no murmur, click, rub or gallop. Abdomen: Soft, mild tender on epigastric area . Bowel sounds normal. No masses,  No organomegaly. Extremities: Extremities normal, atraumatic, no cyanosis or edema. Pulses: 2+ and symmetric all extremities. Skin: Skin color-pink, texture, turgor normal. No rashes or lesions. Capillary refill normal    Neurologic: CNII-XII intact. No focal motor or sensory deficit.        Labs Reviewed:    BMP:   Lab Results   Component Value Date/Time     2017 12:09 PM    K 3.7 04/13/2017 12:09 PM     04/13/2017 12:09 PM    CO2 29 04/13/2017 12:09 PM    AGAP 10 04/13/2017 12:09 PM     (H) 04/13/2017 12:09 PM    BUN 6 (L) 04/13/2017 12:09 PM    CREA 0.81 04/13/2017 12:09 PM    GFRAA >60 04/13/2017 12:09 PM    GFRNA >60 04/13/2017 12:09 PM     CMP:   Lab Results   Component Value Date/Time     04/13/2017 12:09 PM    K 3.7 04/13/2017 12:09 PM     04/13/2017 12:09 PM    CO2 29 04/13/2017 12:09 PM    AGAP 10 04/13/2017 12:09 PM     (H) 04/13/2017 12:09 PM    BUN 6 (L) 04/13/2017 12:09 PM    CREA 0.81 04/13/2017 12:09 PM    GFRAA >60 04/13/2017 12:09 PM    GFRNA >60 04/13/2017 12:09 PM    CA 9.7 04/13/2017 12:09 PM    ALB 3.7 04/13/2017 12:09 PM    TP 8.0 04/13/2017 12:09 PM    GLOB 4.3 (H) 04/13/2017 12:09 PM    AGRAT 0.9 04/13/2017 12:09 PM    SGOT 20 04/13/2017 12:09 PM    ALT 36 04/13/2017 12:09 PM     CBC:   Lab Results   Component Value Date/Time    WBC 9.8 04/13/2017 12:09 PM    HGB 12.1 04/13/2017 12:09 PM    HCT 35.8 04/13/2017 12:09 PM     04/13/2017 12:09 PM     All Cardiac Markers in the last 24 hours:   Lab Results   Component Value Date/Time     04/13/2017 12:09 PM    CKMB <1.0 04/13/2017 12:09 PM    CKND1 Cannot be calulated 04/13/2017 12:09 PM    TROIQ <0.02 04/13/2017 12:09 PM    TNIPOC <0.04 04/13/2017 03:27 PM     Recent Glucose Results:   Lab Results   Component Value Date/Time     (H) 04/13/2017 12:09 PM     ABG: No results found for: PH, PHI, PCO2, PCO2I, PO2, PO2I, HCO3, HCO3I, FIO2, FIO2I  COAGS: No results found for: APTT, PTP, INR  Liver Panel:   Lab Results   Component Value Date/Time    ALB 3.7 04/13/2017 12:09 PM    TP 8.0 04/13/2017 12:09 PM    GLOB 4.3 (H) 04/13/2017 12:09 PM    AGRAT 0.9 04/13/2017 12:09 PM    SGOT 20 04/13/2017 12:09 PM    ALT 36 04/13/2017 12:09 PM    AP 73 04/13/2017 12:09 PM     Pancreatic Markers:   Lab Results   Component Value Date/Time    LPSE 218 04/13/2017 12:09 PM Procedures/imaging: see electronic medical records for all procedures/Xrays and details which were not copied into this note but were reviewed prior to creation of Latha Carrillo MD, Internal Medicine     CC: None

## 2017-04-13 NOTE — ED NOTES
TRANSFER - OUT REPORT:    Verbal report given to [facility] RN(name) on Shay Jacobhan  being transferred to tele(unit) for routine progression of care       Report consisted of patients Situation, Background, Assessment and   Recommendations(SBAR). Information from the following report(s) SBAR, Kardex and ED Summary was reviewed with the receiving nurse. Lines:   Peripheral IV 04/13/17 Left Antecubital (Active)        Opportunity for questions and clarification was provided.       Patient transported with:   Registered Nurse  Tech

## 2017-04-14 ENCOUNTER — APPOINTMENT (OUTPATIENT)
Dept: NUCLEAR MEDICINE | Age: 37
End: 2017-04-14
Attending: HOSPITALIST
Payer: MEDICAID

## 2017-04-14 VITALS
HEIGHT: 67 IN | DIASTOLIC BLOOD PRESSURE: 65 MMHG | RESPIRATION RATE: 16 BRPM | WEIGHT: 293 LBS | OXYGEN SATURATION: 100 % | SYSTOLIC BLOOD PRESSURE: 118 MMHG | HEART RATE: 72 BPM | BODY MASS INDEX: 45.99 KG/M2 | TEMPERATURE: 98 F

## 2017-04-14 LAB
CK MB CFR SERPL CALC: NORMAL % (ref 0–4)
CK MB CFR SERPL CALC: NORMAL % (ref 0–4)
CK MB SERPL-MCNC: <1 NG/ML (ref 5–25)
CK MB SERPL-MCNC: <1 NG/ML (ref 5–25)
CK SERPL-CCNC: 118 U/L (ref 26–192)
CK SERPL-CCNC: 123 U/L (ref 26–192)
GLUCOSE BLD STRIP.AUTO-MCNC: 123 MG/DL (ref 70–110)
GLUCOSE BLD STRIP.AUTO-MCNC: 129 MG/DL (ref 70–110)
TROPONIN I SERPL-MCNC: <0.02 NG/ML (ref 0–0.06)
TROPONIN I SERPL-MCNC: <0.02 NG/ML (ref 0–0.06)

## 2017-04-14 PROCEDURE — 36415 COLL VENOUS BLD VENIPUNCTURE: CPT | Performed by: HOSPITALIST

## 2017-04-14 PROCEDURE — 74011250637 HC RX REV CODE- 250/637: Performed by: HOSPITALIST

## 2017-04-14 PROCEDURE — 99218 HC RM OBSERVATION: CPT

## 2017-04-14 PROCEDURE — 82962 GLUCOSE BLOOD TEST: CPT

## 2017-04-14 PROCEDURE — 96375 TX/PRO/DX INJ NEW DRUG ADDON: CPT

## 2017-04-14 PROCEDURE — 93306 TTE W/DOPPLER COMPLETE: CPT

## 2017-04-14 PROCEDURE — A9500 TC99M SESTAMIBI: HCPCS

## 2017-04-14 PROCEDURE — 74011250636 HC RX REV CODE- 250/636

## 2017-04-14 PROCEDURE — 82550 ASSAY OF CK (CPK): CPT | Performed by: HOSPITALIST

## 2017-04-14 PROCEDURE — 96376 TX/PRO/DX INJ SAME DRUG ADON: CPT

## 2017-04-14 PROCEDURE — 74011250636 HC RX REV CODE- 250/636: Performed by: HOSPITALIST

## 2017-04-14 PROCEDURE — 74011250637 HC RX REV CODE- 250/637: Performed by: INTERNAL MEDICINE

## 2017-04-14 RX ORDER — METOPROLOL TARTRATE 25 MG/1
25 TABLET, FILM COATED ORAL 2 TIMES DAILY
Qty: 60 TAB | Refills: 0 | Status: SHIPPED | OUTPATIENT
Start: 2017-04-14 | End: 2017-04-14

## 2017-04-14 RX ORDER — METOPROLOL TARTRATE 25 MG/1
25 TABLET, FILM COATED ORAL 2 TIMES DAILY
Qty: 60 TAB | Refills: 0 | Status: SHIPPED | OUTPATIENT
Start: 2017-04-14 | End: 2017-10-09

## 2017-04-14 RX ORDER — ONDANSETRON 2 MG/ML
INJECTION INTRAMUSCULAR; INTRAVENOUS
Status: COMPLETED
Start: 2017-04-14 | End: 2017-04-14

## 2017-04-14 RX ORDER — GUAIFENESIN 100 MG/5ML
81 LIQUID (ML) ORAL DAILY
Qty: 30 TAB | Refills: 0 | Status: SHIPPED | OUTPATIENT
Start: 2017-04-14 | End: 2017-10-09

## 2017-04-14 RX ORDER — LOSARTAN POTASSIUM 25 MG/1
25 TABLET ORAL DAILY
Qty: 30 TAB | Refills: 0 | Status: SHIPPED | OUTPATIENT
Start: 2017-04-14 | End: 2018-03-15

## 2017-04-14 RX ORDER — IBUPROFEN 400 MG/1
800 TABLET ORAL
Status: DISCONTINUED | OUTPATIENT
Start: 2017-04-14 | End: 2017-04-14

## 2017-04-14 RX ORDER — ONDANSETRON 2 MG/ML
4 INJECTION INTRAMUSCULAR; INTRAVENOUS ONCE
Status: COMPLETED | OUTPATIENT
Start: 2017-04-14 | End: 2017-04-14

## 2017-04-14 RX ORDER — ENOXAPARIN SODIUM 100 MG/ML
40 INJECTION SUBCUTANEOUS EVERY 24 HOURS
Status: DISCONTINUED | OUTPATIENT
Start: 2017-04-14 | End: 2017-04-14 | Stop reason: HOSPADM

## 2017-04-14 RX ORDER — IBUPROFEN 400 MG/1
800 TABLET ORAL
Status: DISCONTINUED | OUTPATIENT
Start: 2017-04-14 | End: 2017-04-14 | Stop reason: HOSPADM

## 2017-04-14 RX ORDER — ONDANSETRON 2 MG/ML
4 INJECTION INTRAMUSCULAR; INTRAVENOUS
Status: DISCONTINUED | OUTPATIENT
Start: 2017-04-14 | End: 2017-04-14 | Stop reason: HOSPADM

## 2017-04-14 RX ADMIN — ONDANSETRON 4 MG: 2 INJECTION INTRAMUSCULAR; INTRAVENOUS at 09:59

## 2017-04-14 RX ADMIN — PANTOPRAZOLE SODIUM 40 MG: 40 TABLET, DELAYED RELEASE ORAL at 06:39

## 2017-04-14 RX ADMIN — METOPROLOL TARTRATE 25 MG: 25 TABLET ORAL at 13:22

## 2017-04-14 RX ADMIN — ONDANSETRON 4 MG: 2 INJECTION INTRAMUSCULAR; INTRAVENOUS at 16:08

## 2017-04-14 RX ADMIN — Medication 1 MG: at 02:00

## 2017-04-14 RX ADMIN — LOSARTAN POTASSIUM 25 MG: 25 TABLET ORAL at 13:23

## 2017-04-14 RX ADMIN — Medication 1 MG: at 05:42

## 2017-04-14 RX ADMIN — Medication 10 ML: at 05:42

## 2017-04-14 RX ADMIN — Medication 5 ML: at 14:00

## 2017-04-14 RX ADMIN — ASPIRIN 81 MG: 81 TABLET, CHEWABLE ORAL at 13:23

## 2017-04-14 NOTE — DISCHARGE INSTRUCTIONS
Chest Pain: Care Instructions  Your Care Instructions  There are many things that can cause chest pain. Some are not serious and will get better on their own in a few days. But some kinds of chest pain need more testing and treatment. Your doctor may have recommended a follow-up visit in the next 8 to 12 hours. If you are not getting better, you may need more tests or treatment. Even though your doctor has released you, you still need to watch for any problems. The doctor carefully checked you, but sometimes problems can develop later. If you have new symptoms or if your symptoms do not get better, get medical care right away. If you have worse or different chest pain or pressure that lasts more than 5 minutes or you passed out (lost consciousness), call 911 or seek other emergency help right away. A medical visit is only one step in your treatment. Even if you feel better, you still need to do what your doctor recommends, such as going to all suggested follow-up appointments and taking medicines exactly as directed. This will help you recover and help prevent future problems. How can you care for yourself at home? · Rest until you feel better. · Take your medicine exactly as prescribed. Call your doctor if you think you are having a problem with your medicine. · Do not drive after taking a prescription pain medicine. When should you call for help? Call 911 if:  · You passed out (lost consciousness). · You have severe difficulty breathing. · You have symptoms of a heart attack. These may include:  ¨ Chest pain or pressure, or a strange feeling in your chest.  ¨ Sweating. ¨ Shortness of breath. ¨ Nausea or vomiting. ¨ Pain, pressure, or a strange feeling in your back, neck, jaw, or upper belly or in one or both shoulders or arms. ¨ Lightheadedness or sudden weakness. ¨ A fast or irregular heartbeat.   After you call 911, the  may tell you to chew 1 adult-strength or 2 to 4 low-dose aspirin. Wait for an ambulance. Do not try to drive yourself. Call your doctor today if:  · You have any trouble breathing. · Your chest pain gets worse. · You are dizzy or lightheaded, or you feel like you may faint. · You are not getting better as expected. · You are having new or different chest pain. Where can you learn more? Go to http://bambi-carmelina.info/. Enter A120 in the search box to learn more about \"Chest Pain: Care Instructions. \"  Current as of: May 27, 2016  Content Version: 11.2  © 6453-8123 [employer]. Care instructions adapted under license by [employer] (which disclaims liability or warranty for this information). If you have questions about a medical condition or this instruction, always ask your healthcare professional. [address] any warranty or liability for your use of this information. DISCHARGE SUMMARY from Nurse    The following personal items are in your possession at time of discharge:       Visual Aid: Glasses, With patient           Clothing: Footwear, Pants, Shirt (purse, flip flops)                PATIENT INSTRUCTIONS:    After general anesthesia or intravenous sedation, for 24 hours or while taking prescription Narcotics:  · Limit your activities  · Do not drive and operate hazardous machinery  · Do not make important personal or business decisions  · Do  not drink alcoholic beverages  · If you have not urinated within 8 hours after discharge, please contact your surgeon on call.     Report the following to your surgeon:  · Excessive pain, swelling, redness or odor of or around the surgical area  · Temperature over 100.5  · Nausea and vomiting lasting longer than 4 hours or if unable to take medications  · Any signs of decreased circulation or nerve impairment to extremity: change in color, persistent  numbness, tingling, coldness or increase pain  · Any questions        What to do at Home:  Recommended activity: Activity as tolerated          *  Please give a list of your current medications to your Primary Care Provider. *  Please update this list whenever your medications are discontinued, doses are      changed, or new medications (including over-the-counter products) are added. *  Please carry medication information at all times in case of emergency situations. These are general instructions for a healthy lifestyle:    No smoking/ No tobacco products/ Avoid exposure to second hand smoke    Surgeon General's Warning:  Quitting smoking now greatly reduces serious risk to your health. Obesity, smoking, and sedentary lifestyle greatly increases your risk for illness    A healthy diet, regular physical exercise & weight monitoring are important for maintaining a healthy lifestyle    You may be retaining fluid if you have a history of heart failure or if you experience any of the following symptoms:  Weight gain of 3 pounds or more overnight or 5 pounds in a week, increased swelling in our hands or feet or shortness of breath while lying flat in bed. Please call your doctor as soon as you notice any of these symptoms; do not wait until your next office visit. Recognize signs and symptoms of STROKE:    F-face looks uneven    A-arms unable to move or move unevenly    S-speech slurred or non-existent    T-time-call 911 as soon as signs and symptoms begin-DO NOT go       Back to bed or wait to see if you get better-TIME IS BRAIN. Warning Signs of HEART ATTACK     Call 911 if you have these symptoms:   Chest discomfort. Most heart attacks involve discomfort in the center of the chest that lasts more than a few minutes, or that goes away and comes back. It can feel like uncomfortable pressure, squeezing, fullness, or pain.  Discomfort in other areas of the upper body. Symptoms can include pain or discomfort in one or both arms, the back, neck, jaw, or stomach.    Shortness of breath with or without chest discomfort.  Other signs may include breaking out in a cold sweat, nausea, or lightheadedness. Don't wait more than five minutes to call 911 - MINUTES MATTER! Fast action can save your life. Calling 911 is almost always the fastest way to get lifesaving treatment. Emergency Medical Services staff can begin treatment when they arrive -- up to an hour sooner than if someone gets to the hospital by car. The discharge information has been reviewed with the patient. The patient verbalized understanding. Discharge medications reviewed with the patient and appropriate educational materials and side effects teaching were provided.     Patient armband removed and shredded

## 2017-04-14 NOTE — PROGRESS NOTES
Hospitalist Progress Note-critical care note     Patient: Julia Blanton MRN: [id number]  CSN: [id number]    YOB: 1980  Age: 31 y.o. Sex: female    DOA: 4/13/2017 LOS:  LOS: 0 days            Chief complaint:    Assessment/Plan         Patient Active Problem List   Diagnosis Code    Chest pain R07.9    Bilateral calf pain M79.661, M79.662    Controlled type 2 diabetes mellitus with oral complication (HCC) A96.153    Acid reflux disease K21.9    HTN (hypertension) I10     1. Chest pain ,   cta to negative for PE,  Echo-ef wnl   Stress test pending    Gi cocktail in ed,   uds   2. gerd   ppi ,      3 DM type II ,   ssi, diabetic diet , hypoglycemia protocol       4 HTN, accelerated  Continue home medication.     5 morbid obesity     Subjective: would like to have something else for pain   Nurse: no acute issue     Review of systems:    General: No fevers or chills. Cardiovascular: chest pain or pressure. No palpitations. Pulmonary: No shortness of breath. Gastrointestinal: No nausea, vomiting. Vital signs/Intake and Output:  Visit Vitals    /65 (BP 1 Location: Right arm, BP Patient Position: At rest)    Pulse 72    Temp 98 °F (36.7 °C)    Resp 16    Ht 5' 7\" (1.702 m)    Wt 135.2 kg (298 lb)    SpO2 100%    BMI 46.67 kg/m2     Current Shift:  04/14 0701 - 04/14 1900  In: 240 [P.O.:240]  Out: -   Last three shifts:       Physical Exam:  General: WD, WN. Alert, cooperative, no acute distress    HEENT: NC, Atraumatic. PERRLA, anicteric sclerae. Lungs: CTA Bilaterally. No Wheezing/Rhonchi/Rales. Heart:  Regular  rhythm,  No murmur, No Rubs, No Gallops  Abdomen: Soft, Non distended, mild tender in epigastric area   +Bowel sounds,   Extremities: No c/c/e  Psych:   Not anxious or agitated. Neurologic:  No acute neurological deficit.              Labs: Results:       Chemistry Recent Labs      04/13/17   1209   GLU  140*   NA  140   K  3.7   CL  101   CO2  29   BUN  6* CREA  0.81   CA  9.7   AGAP  10   BUCR  7*   AP  73   TP  8.0   ALB  3.7   GLOB  4.3*   AGRAT  0.9      CBC w/Diff Recent Labs      04/13/17   1209   WBC  9.8   RBC  4.30   HGB  12.1   HCT  35.8   PLT  334   GRANS  77*   LYMPH  17*   EOS  3      Cardiac Enzymes Recent Labs      04/14/17   0544  04/13/17   2349   CPK  118  123   CKND1  Cannot be calulated  Cannot be calulated      Coagulation No results for input(s): PTP, INR, APTT in the last 72 hours. No lab exists for component: INREXT    Lipid Panel No results found for: CHOL, CHOLPOCT, CHOLX, CHLST, CHOLV, [id number], HDL, LDL, NLDLCT, DLDL, LDLC, DLDLP, [id number], VLDLC, VLDL, TGL, TGLX, TRIGL, [id number], TRIGP, TGLPOCT, [id number], CHHD, CHHDX   BNP No results for input(s): BNPP in the last 72 hours.    Liver Enzymes Recent Labs      04/13/17   1209   TP  8.0   ALB  3.7   AP  73   SGOT  20      Thyroid Studies No results found for: T4, T3U, TSH, TSHEXT     Procedures/imaging: see electronic medical records for all procedures/Xrays and details which were not copied into this note but were reviewed prior to creation of Jayro Randall MD

## 2017-04-14 NOTE — PROGRESS NOTES
1948 Admission database and assessment completed at this time. Patient is resting in bed in NAD. Reviewed plan of care with patient, patient verbalized understanding. However, patient's IV is positional and continues to beep throughout completion of database and patient is not agreeable to new PIV insertion. Patient made aware that fluids are ordered continuously and this may be a disturbance later as patient attempts to sleep. Pt verbalized understanding, but still refuses. Patient reporting a headache with only morphine available for pain. Dr. Khloe Gale paged, order received for one time dose of tylenol. 2146 Notified by monitor [employer] of patient's HR in 140s. Patient medicated for headache and ambulating to RR at this time. Will continue to monitor. 2159 Patient back in bed, HR still at 153. Will continue to monitor for trend. 2211 Patient's HR sustaining in 140-150s. Dr. April Benitez paged with update. Orders received. 2258 Patient medicated for pain and HR, updated again regarding POC and need for NPO status after midnight. 8430 Patient refused Lovenox injection. Reports: \"It hurt too bad when I got it the last time. \" Patient educated on the importance of Lovenox and that in her case it is not just being administered for the prevention of clots, but also to facilitate myocardial perfusion and oxygenation. Patient still refuses.

## 2017-04-14 NOTE — PROGRESS NOTES
Met with pt at bedside, attended IDR. Pt admitted for chest pain, awaiting clearance from cardiology to discharge home. Pt lives with spouse and children, uses no DME, is independent with ADLs/IADLs. No immediate needs identified/voiced by pt, CM remains available if needed. Care Management Interventions  PCP Verified by CM:  Yes  Transition of Care Consult (CM Consult): Discharge Planning  Current Support Network: Lives with Spouse  Confirm Follow Up Transport: Family  Plan discussed with Pt/Family/Caregiver: Yes  Discharge Location  Discharge Placement: Home with family assistance

## 2017-04-14 NOTE — DISCHARGE SUMMARY
Discharge Summary    Patient: Ricardo Seo MRN: [id number]  CSN: [id number]    YOB: 1980  Age: 31 y.o. Sex: female    DOA: 4/13/2017 LOS:  LOS: 0 days   Discharge Date:      Primary Care Provider:  Volodymyr Cotton MD    Admission Diagnoses: Chest pain, abnormal ECG    Discharge Diagnoses:    Patient Active Problem List   Diagnosis Code    Chest pain R07.9    Bilateral calf pain M79.661, M79.662    Controlled type 2 diabetes mellitus with oral complication (HCC) C21.055    Acid reflux disease K21.9    HTN (hypertension) I10       Discharge Condition: Stable    Discharge Medications:     Current Discharge Medication List      START taking these medications    Details   aspirin 81 mg chewable tablet Take 1 Tab by mouth daily. Qty: 30 Tab, Refills: 0      metoprolol tartrate (LOPRESSOR) 25 mg tablet Take 1 Tab by mouth two (2) times a day. Qty: 60 Tab, Refills: 0         CONTINUE these medications which have CHANGED    Details   losartan (COZAAR) 25 mg tablet Take 1 Tab by mouth daily. Qty: 30 Tab, Refills: 0      HYDROcodone-acetaminophen (NORCO) 5-325 mg per tablet Take 1 Tab by mouth every six (6) hours as needed for Pain. Max Daily Amount: 4 Tabs. Qty: 16 Tab, Refills: 0         CONTINUE these medications which have NOT CHANGED    Details   glipiZIDE (GLUCOTROL) 10 mg tablet Take 10 mg by mouth two (2) times a day. pantoprazole (PROTONIX) 20 mg tablet Take 20 mg by mouth daily. dicyclomine (BENTYL) 10 mg capsule Take 10 mg by mouth 4 times daily (before meals and nightly). LANSOPRAZOLE (PREVACID PO) Take  by mouth. PROMETHAZINE HCL (PHENERGAN PO) Take  by mouth.      polyethylene glycol (MIRALAX) 17 gram packet Take 17 g by mouth daily.          STOP taking these medications       oxycodone-acetaminophen (PERCOCET) 5-325 mg per tablet Comments:   Reason for Stopping:               Procedures : stress test     Consults: Cardiology      PHYSICAL EXAM   Visit Vitals  /65 (BP 1 Location: Right arm, BP Patient Position: At rest)    Pulse 72    Temp 98 °F (36.7 °C)    Resp 16    Ht 5' 7\" (1.702 m)    Wt 135.2 kg (298 lb)    SpO2 100%    BMI 46.67 kg/m2     General: Awake, cooperative, no acute distress    HEENT: NC, Atraumatic. PERRLA, EOMI. Anicteric sclerae. Lungs:  CTA Bilaterally. No Wheezing/Rhonchi/Rales. Heart:  Regular  rhythm,  No murmur, No Rubs, No Gallops  Abdomen: Soft, Non distended, Non tender. +Bowel sounds,   Extremities: No c/c/e  Psych:   Not anxious or agitated. Neurologic:  No acute neurological deficits. Admission HPI :   Maia Hayward is a 31 y.o. female who hx of DM , HTN ,gerd came to ed due to chest pain since last Saturday-on and off, epigastric area radiated to left chest. Associated sob. No palpitation. Throbbing pain, sitting straight better, lying and exertion worsening. Had similar episodes several years ago with negative stress test. Also reported pain in bilateral calf pain since Saturday. ekg changed      Denies any slurred speech/headache/n/v/blurred vission/d/c/palpitation/gait change/bleeding. Denies smoking/ any alcohol or drug use.      She was given aspirin and nitro paste in ED. Pt still has on and off chest pain when she was seen     Hospital Course : For the chest test, CTA negative for PE, pvl negative for dvt. Stress test negative for ischemic change and her ECHO was within normal limit. Her pain is reproducible, most likely was due to musculoskeletal etiology. We also control her HTN, medication was adjusted. DM was controlled per insulin.      Recommend to f./u with gi for further work up for reflux disease    Activity: Activity as tolerated    Diet: Diabetic Diet    Follow-up: pcm     Disposition: home     Minutes spent on discharge: 60 min       Labs: Results:       Chemistry Recent Labs      04/13/17   1209   GLU  140*   NA  140   K  3.7   CL  101   CO2  29   BUN  6* CREA  0.81   CA  9.7   AGAP  10   BUCR  7*   AP  73   TP  8.0   ALB  3.7   GLOB  4.3*   AGRAT  0.9      CBC w/Diff Recent Labs      04/13/17   1209   WBC  9.8   RBC  4.30   HGB  12.1   HCT  35.8   PLT  334   GRANS  77*   LYMPH  17*   EOS  3      Cardiac Enzymes Recent Labs      04/14/17   0544  04/13/17   2349   CPK  118  123   CKND1  Cannot be calulated  Cannot be calulated      Coagulation No results for input(s): PTP, INR, APTT in the last 72 hours. No lab exists for component: INREXT    Lipid Panel No results found for: CHOL, CHOLPOCT, CHOLX, CHLST, CHOLV, [id number], HDL, LDL, NLDLCT, DLDL, LDLC, DLDLP, [id number], VLDLC, VLDL, TGL, TGLX, TRIGL, [id number], TRIGP, TGLPOCT, [id number], CHHD, CHHDX   BNP No results for input(s): BNPP in the last 72 hours. Liver Enzymes Recent Labs      04/13/17   1209   TP  8.0   ALB  3.7   AP  73   SGOT  20      Thyroid Studies No results found for: T4, T3U, TSH, TSHEXT         Significant Diagnostic Studies: Xr Chest Pa Lat    Result Date: 4/13/2017  History: Chest pain Views: Frontal and lateral views. Comparison study: None. Findings: Heart and mediastinum: Unremarkable. Lungs and pleura: Clear without consolidation or pleural effusion. Aorta: Unremarkable. Bones: Within normal limits for age. Impression: No significant abnormality. Cta Chest W Or W Wo Cont    Result Date: 4/13/2017  EXAM: CTA Chest INDICATION: Pain. COMPARISON: Chest pain. TECHNIQUE: Axial CT imaging from the thoracic inlet through the diaphragm with intravenous contrast. Coronal and sagittal MIP reformats were generated. DOSE REDUCTION:  One or more dose reduction techniques were used on this CT: automated exposure control, adjustment of the mAs and/or kVp according to patient's size, and iterative reconstruction techniques.  The specific techniques utilized on this CT exam have been documented in the patient's electronic medical record. _______________ FINDINGS: EXAM QUALITY: Overall exam quality is optimal Pulmonary arterial enhancement is optimal. The breath hold is optimal. There are no significant artifacts impacting image quality. PULMONARY ARTERIES: No evidence of pulmonary embolism. MEDIASTINUM: Normal heart size. No evidence of right heart strain. Aorta is unremarkable. No pericardial effusion. LYMPH NODES: No enlarged nodes. AIRWAY: Normal. LUNGS: No suspicious nodule or mass. No abnormal opacities. PLEURA: Normal. Specifically, no pneumothorax or pleural effusion. UPPER ABDOMEN: The gallbladder has been removed. Mildly heterogeneous enhancement is present within the inferior anterior left liver, likely related to fatty infiltration. OTHER: No acute or aggressive osseous abnormalities identified. _______________     IMPRESSION: 1. No evidence of pulmonary embolism. No acute process in the chest. 2.  Status post cholecystectomy. Mildly heterogeneous enhancement in the anterior inferior left liver, likely related to fatty infiltration.             [facility]     CC: Pantera Lara MD

## 2017-04-14 NOTE — PROGRESS NOTES
0800 Assumed pt care, pt is alert and oriented x4, SR on the monitor, lungs are clear on room air, VSS. Pt denies chest pain at this time, but states, \"it comes and goes. \" Pt is resting comfortably in bed, call bell has been left within reach, and pt denies any further needs at this time.

## 2017-04-14 NOTE — ROUTINE PROCESS
Patient prepped for Nuc Stress, c/o sustained nausea after initial Rest Scan, Dr Garth Mccracken aware, orders given. NTG Paste removed.

## 2017-04-14 NOTE — ROUTINE PROCESS
Bedside and Verbal shift change report given to SAWYER Troncoso (oncoming nurse) by Speedy Melvin RN (offgoing nurse). Report included the following information SBAR, Kardex and MAR.

## 2017-04-14 NOTE — PROGRESS NOTES
Cardiology Progress Note        Patient: Elizabeth Allen        Sex: female          DOA: 4/13/2017  YOB: 1980      Age:  31 y.o.        LOS:  LOS: 0 days   Assessment/Plan     Principal Problem:    Chest pain (4/13/2017)    Active Problems:    Bilateral calf pain (4/13/2017)      Controlled type 2 diabetes mellitus with oral complication ([address] (1/09/5555)      Acid reflux disease (4/13/2017)      HTN (hypertension) (4/13/2017)        Plan:  Stress test is normal, echo is also normal.  Episode sinus tachycardia last night. Continue with aspirin and metoprolol  Continue with risk factors management. Discussed with pt and Dr. Usman Sotomayor                    Subjective:    cc:  No CP      REVIEW OF SYSTEMS: NO CP, SOB, N,V,D, F,C  Objective:      Visit Vitals    /65 (BP 1 Location: Right arm, BP Patient Position: At rest)    Pulse 72    Temp 98 °F (36.7 °C)    Resp 16    Ht 5' 7\" (1.702 m)    Wt 135.2 kg (298 lb)    SpO2 100%    BMI 46.67 kg/m2     Body mass index is 46.67 kg/(m^2). Physical Exam:  General Appearance: Comfortable, not using accessory muscles of respiration. HEENT: RAFAEL. HEAD: Atraumatic  NECK: No JVD, no thyroidomeglay. CAROTIDS:  LUNGS: Clear bilaterally. HEART: S1+S2 audible, no murmur, no pericardial rub. ABD: Non-tender, BS Audible    EXT: No edema, and no cysnosis. VASCULAR EXAM: Pulses are intact.       Medication:  Current Facility-Administered Medications   Medication Dose Route Frequency    ibuprofen (MOTRIN) tablet 800 mg  800 mg Oral Q8H PRN    enoxaparin (LOVENOX) injection 40 mg  40 mg SubCUTAneous Q24H    ondansetron (ZOFRAN) injection 4 mg  4 mg IntraVENous Q8H PRN    sodium chloride (NS) flush 5-10 mL  5-10 mL IntraVENous Q8H    sodium chloride (NS) flush 5-10 mL  5-10 mL IntraVENous PRN    losartan (COZAAR) tablet 25 mg  25 mg Oral DAILY    morphine injection 1 mg  1 mg IntraVENous Q3H PRN    nitroglycerin (NITROSTAT) tablet 0.4 mg  0.4 mg SubLINGual PRN    insulin lispro (HUMALOG) injection   SubCUTAneous AC&HS    glucose chewable tablet 16 g  4 Tab Oral PRN    glucagon (GLUCAGEN) injection 1 mg  1 mg IntraMUSCular PRN    dextrose (D50W) injection syrg 12.5-25 g  25-50 mL IntraVENous PRN    pantoprazole (PROTONIX) tablet 40 mg  40 mg Oral ACB&D    0.9% sodium chloride infusion  75 mL/hr IntraVENous CONTINUOUS    aspirin chewable tablet 81 mg  81 mg Oral DAILY    metoprolol tartrate (LOPRESSOR) tablet 25 mg  25 mg Oral BID               Lab/Data Reviewed: All lab results for the last 24 hours reviewed.      Recent Labs      04/13/17   1209   WBC  9.8   HGB  12.1   HCT  35.8   PLT  334     Recent Labs      04/13/17   1209   NA  140   K  3.7   CL  101   CO2  29   GLU  140*   BUN  6*   CREA  0.81   CA  9.7       Signed By: Evelin Leung MD     April 14, 2017

## 2017-04-17 LAB
ATTENDING PHYSICIAN, CST07: NORMAL
DIAGNOSIS, 93000: NORMAL
DUKE TM SCORE RESULT, CST14: NORMAL
DUKE TREADMILL SCORE, CST13: -7
ECG INTERP BEFORE EX, CST11: NORMAL
ECG INTERP DURING EX, CST12: NORMAL
FUNCTIONAL CAPACITY, CST17: NORMAL
KNOWN CARDIAC CONDITION, CST08: NORMAL
MAX. DIASTOLIC BP, CST04: 86 MMHG
MAX. HEART RATE, CST05: 166 BPM
MAX. SYSTOLIC BP, CST03: 172 MMHG
OVERALL BP RESPONSE TO EXERCISE, CST16: NORMAL
OVERALL HR RESPONSE TO EXERCISE, CST15: NORMAL
PEAK EX METS, CST10: 4.6 METS
PROTOCOL NAME, CST01: NORMAL
TEST INDICATION, CST09: NORMAL

## 2017-04-23 LAB
ATRIAL RATE: 103 BPM
ATRIAL RATE: 104 BPM
ATRIAL RATE: 91 BPM
CALCULATED P AXIS, ECG09: 38 DEGREES
CALCULATED P AXIS, ECG09: 46 DEGREES
CALCULATED P AXIS, ECG09: 54 DEGREES
CALCULATED R AXIS, ECG10: 20 DEGREES
CALCULATED R AXIS, ECG10: 28 DEGREES
CALCULATED R AXIS, ECG10: 34 DEGREES
CALCULATED T AXIS, ECG11: -11 DEGREES
CALCULATED T AXIS, ECG11: -14 DEGREES
CALCULATED T AXIS, ECG11: -29 DEGREES
DIAGNOSIS, 93000: NORMAL
P-R INTERVAL, ECG05: 140 MS
P-R INTERVAL, ECG05: 140 MS
P-R INTERVAL, ECG05: 150 MS
Q-T INTERVAL, ECG07: 350 MS
Q-T INTERVAL, ECG07: 376 MS
Q-T INTERVAL, ECG07: 438 MS
QRS DURATION, ECG06: 72 MS
QRS DURATION, ECG06: 74 MS
QRS DURATION, ECG06: 86 MS
QTC CALCULATION (BEZET), ECG08: 460 MS
QTC CALCULATION (BEZET), ECG08: 492 MS
QTC CALCULATION (BEZET), ECG08: 538 MS
VENTRICULAR RATE, ECG03: 103 BPM
VENTRICULAR RATE, ECG03: 104 BPM
VENTRICULAR RATE, ECG03: 91 BPM

## 2017-06-15 ENCOUNTER — HOSPITAL ENCOUNTER (OUTPATIENT)
Age: 37
Setting detail: OBSERVATION
Discharge: LEFT BEFORE DISCHARGE | DRG: 254 | End: 2017-06-16
Attending: FAMILY MEDICINE | Admitting: INTERNAL MEDICINE
Payer: MEDICAID

## 2017-06-15 DIAGNOSIS — K92.2 GASTROINTESTINAL HEMORRHAGE, UNSPECIFIED GASTROINTESTINAL HEMORRHAGE TYPE: Primary | ICD-10-CM

## 2017-06-15 PROBLEM — T39.395A GI BLEED DUE TO NSAIDS: Status: ACTIVE | Noted: 2017-06-15

## 2017-06-15 LAB
ALBUMIN SERPL BCP-MCNC: 3.6 G/DL (ref 3.4–5)
ALBUMIN/GLOB SERPL: 0.9 {RATIO} (ref 0.8–1.7)
ALP SERPL-CCNC: 90 U/L (ref 45–117)
ALT SERPL-CCNC: 39 U/L (ref 13–56)
ANION GAP BLD CALC-SCNC: 10 MMOL/L (ref 3–18)
APPEARANCE UR: CLEAR
APTT PPP: 27.7 SEC (ref 23–36.4)
AST SERPL W P-5'-P-CCNC: 29 U/L (ref 15–37)
BACTERIA URNS QL MICRO: ABNORMAL /HPF
BASOPHILS # BLD AUTO: 0 K/UL (ref 0–0.06)
BASOPHILS # BLD: 0 % (ref 0–2)
BILIRUB SERPL-MCNC: 0.4 MG/DL (ref 0.2–1)
BILIRUB UR QL: NEGATIVE
BUN SERPL-MCNC: 8 MG/DL (ref 7–18)
BUN/CREAT SERPL: 9 (ref 12–20)
CALCIUM SERPL-MCNC: 9.1 MG/DL (ref 8.5–10.1)
CHLORIDE SERPL-SCNC: 104 MMOL/L (ref 100–108)
CO2 SERPL-SCNC: 28 MMOL/L (ref 21–32)
COLOR UR: ABNORMAL
CREAT SERPL-MCNC: 0.9 MG/DL (ref 0.6–1.3)
DIFFERENTIAL METHOD BLD: ABNORMAL
EOSINOPHIL # BLD: 0.2 K/UL (ref 0–0.4)
EOSINOPHIL NFR BLD: 2 % (ref 0–5)
EPITH CASTS URNS QL MICRO: ABNORMAL /LPF (ref 0–5)
ERYTHROCYTE [DISTWIDTH] IN BLOOD BY AUTOMATED COUNT: 14.3 % (ref 11.6–14.5)
EST. AVERAGE GLUCOSE BLD GHB EST-MCNC: 151 MG/DL
GLOBULIN SER CALC-MCNC: 3.8 G/DL (ref 2–4)
GLUCOSE BLD STRIP.AUTO-MCNC: 118 MG/DL (ref 70–110)
GLUCOSE BLD STRIP.AUTO-MCNC: 138 MG/DL (ref 70–110)
GLUCOSE SERPL-MCNC: 202 MG/DL (ref 74–99)
GLUCOSE UR STRIP.AUTO-MCNC: NEGATIVE MG/DL
HBA1C MFR BLD: 6.9 % (ref 4.5–5.6)
HCG UR QL: NEGATIVE
HCT VFR BLD AUTO: 34.1 % (ref 35–45)
HCT VFR BLD AUTO: 37.5 % (ref 35–45)
HGB BLD-MCNC: 11.5 G/DL (ref 12–16)
HGB BLD-MCNC: 12.6 G/DL (ref 12–16)
HGB UR QL STRIP: NEGATIVE
INR PPP: 1.1 (ref 0.8–1.2)
KETONES UR QL STRIP.AUTO: ABNORMAL MG/DL
LEUKOCYTE ESTERASE UR QL STRIP.AUTO: ABNORMAL
LYMPHOCYTES # BLD AUTO: 20 % (ref 21–52)
LYMPHOCYTES # BLD: 1.9 K/UL (ref 0.9–3.6)
MCH RBC QN AUTO: 28.1 PG (ref 24–34)
MCHC RBC AUTO-ENTMCNC: 33.6 G/DL (ref 31–37)
MCV RBC AUTO: 83.7 FL (ref 74–97)
MONOCYTES # BLD: 0.3 K/UL (ref 0.05–1.2)
MONOCYTES NFR BLD AUTO: 3 % (ref 3–10)
MUCOUS THREADS URNS QL MICRO: ABNORMAL /LPF
NEUTS SEG # BLD: 7.2 K/UL (ref 1.8–8)
NEUTS SEG NFR BLD AUTO: 75 % (ref 40–73)
NITRITE UR QL STRIP.AUTO: NEGATIVE
PH UR STRIP: 8 [PH] (ref 5–8)
PLATELET # BLD AUTO: 388 K/UL (ref 135–420)
PMV BLD AUTO: 8.9 FL (ref 9.2–11.8)
POTASSIUM SERPL-SCNC: 3.9 MMOL/L (ref 3.5–5.5)
PROT SERPL-MCNC: 7.4 G/DL (ref 6.4–8.2)
PROT UR STRIP-MCNC: ABNORMAL MG/DL
PROTHROMBIN TIME: 13.6 SEC (ref 11.5–15.2)
RBC # BLD AUTO: 4.48 M/UL (ref 4.2–5.3)
RBC #/AREA URNS HPF: ABNORMAL /HPF (ref 0–5)
SODIUM SERPL-SCNC: 142 MMOL/L (ref 136–145)
SP GR UR REFRACTOMETRY: 1.03 (ref 1–1.03)
UROBILINOGEN UR QL STRIP.AUTO: 1 EU/DL (ref 0.2–1)
WBC # BLD AUTO: 9.6 K/UL (ref 4.6–13.2)
WBC URNS QL MICRO: ABNORMAL /HPF (ref 0–5)

## 2017-06-15 PROCEDURE — 96365 THER/PROPH/DIAG IV INF INIT: CPT

## 2017-06-15 PROCEDURE — C9113 INJ PANTOPRAZOLE SODIUM, VIA: HCPCS | Performed by: FAMILY MEDICINE

## 2017-06-15 PROCEDURE — 86920 COMPATIBILITY TEST SPIN: CPT | Performed by: FAMILY MEDICINE

## 2017-06-15 PROCEDURE — 85018 HEMOGLOBIN: CPT | Performed by: INTERNAL MEDICINE

## 2017-06-15 PROCEDURE — 74011250636 HC RX REV CODE- 250/636: Performed by: INTERNAL MEDICINE

## 2017-06-15 PROCEDURE — 85610 PROTHROMBIN TIME: CPT | Performed by: FAMILY MEDICINE

## 2017-06-15 PROCEDURE — 65270000029 HC RM PRIVATE

## 2017-06-15 PROCEDURE — 86900 BLOOD TYPING SEROLOGIC ABO: CPT | Performed by: FAMILY MEDICINE

## 2017-06-15 PROCEDURE — 85730 THROMBOPLASTIN TIME PARTIAL: CPT | Performed by: FAMILY MEDICINE

## 2017-06-15 PROCEDURE — 74011250637 HC RX REV CODE- 250/637: Performed by: INTERNAL MEDICINE

## 2017-06-15 PROCEDURE — 74011250636 HC RX REV CODE- 250/636: Performed by: FAMILY MEDICINE

## 2017-06-15 PROCEDURE — 81025 URINE PREGNANCY TEST: CPT

## 2017-06-15 PROCEDURE — 85025 COMPLETE CBC W/AUTO DIFF WBC: CPT | Performed by: FAMILY MEDICINE

## 2017-06-15 PROCEDURE — 74011000250 HC RX REV CODE- 250: Performed by: FAMILY MEDICINE

## 2017-06-15 PROCEDURE — 77030033269 HC SLV COMPR SCD KNE2 CARD -B

## 2017-06-15 PROCEDURE — 86677 HELICOBACTER PYLORI ANTIBODY: CPT | Performed by: INTERNAL MEDICINE

## 2017-06-15 PROCEDURE — 82962 GLUCOSE BLOOD TEST: CPT

## 2017-06-15 PROCEDURE — 93005 ELECTROCARDIOGRAM TRACING: CPT

## 2017-06-15 PROCEDURE — 83036 HEMOGLOBIN GLYCOSYLATED A1C: CPT | Performed by: INTERNAL MEDICINE

## 2017-06-15 PROCEDURE — 80053 COMPREHEN METABOLIC PANEL: CPT | Performed by: FAMILY MEDICINE

## 2017-06-15 PROCEDURE — 81001 URINALYSIS AUTO W/SCOPE: CPT | Performed by: EMERGENCY MEDICINE

## 2017-06-15 PROCEDURE — 99218 HC RM OBSERVATION: CPT

## 2017-06-15 PROCEDURE — 36415 COLL VENOUS BLD VENIPUNCTURE: CPT | Performed by: INTERNAL MEDICINE

## 2017-06-15 PROCEDURE — 74011000258 HC RX REV CODE- 258: Performed by: FAMILY MEDICINE

## 2017-06-15 PROCEDURE — 99285 EMERGENCY DEPT VISIT HI MDM: CPT

## 2017-06-15 RX ORDER — SODIUM CHLORIDE 9 MG/ML
100 INJECTION, SOLUTION INTRAVENOUS CONTINUOUS
Status: DISCONTINUED | OUTPATIENT
Start: 2017-06-15 | End: 2017-06-16 | Stop reason: HOSPADM

## 2017-06-15 RX ORDER — DEXTROSE 50 % IN WATER (D50W) INTRAVENOUS SYRINGE
25-50 AS NEEDED
Status: DISCONTINUED | OUTPATIENT
Start: 2017-06-15 | End: 2017-06-16 | Stop reason: HOSPADM

## 2017-06-15 RX ORDER — DEXTROMETHORPHAN/PSEUDOEPHED 2.5-7.5/.8
1.2 DROPS ORAL
Status: CANCELLED | OUTPATIENT
Start: 2017-06-15

## 2017-06-15 RX ORDER — SODIUM CHLORIDE 9 MG/ML
250 INJECTION, SOLUTION INTRAVENOUS AS NEEDED
Status: DISCONTINUED | OUTPATIENT
Start: 2017-06-15 | End: 2017-06-16 | Stop reason: HOSPADM

## 2017-06-15 RX ORDER — EPINEPHRINE 0.1 MG/ML
1 INJECTION INTRACARDIAC; INTRAVENOUS
Status: CANCELLED | OUTPATIENT
Start: 2017-06-15 | End: 2017-06-16

## 2017-06-15 RX ORDER — OMEPRAZOLE 10 MG/1
10 CAPSULE, DELAYED RELEASE ORAL DAILY
COMMUNITY
End: 2018-03-01 | Stop reason: ALTCHOICE

## 2017-06-15 RX ORDER — INSULIN LISPRO 100 [IU]/ML
INJECTION, SOLUTION INTRAVENOUS; SUBCUTANEOUS
Status: DISCONTINUED | OUTPATIENT
Start: 2017-06-15 | End: 2017-06-16 | Stop reason: HOSPADM

## 2017-06-15 RX ORDER — MORPHINE SULFATE 10 MG/ML
2 INJECTION, SOLUTION INTRAMUSCULAR; INTRAVENOUS
Status: DISCONTINUED | OUTPATIENT
Start: 2017-06-15 | End: 2017-06-16 | Stop reason: HOSPADM

## 2017-06-15 RX ORDER — METOPROLOL TARTRATE 25 MG/1
25 TABLET, FILM COATED ORAL 2 TIMES DAILY
Status: DISCONTINUED | OUTPATIENT
Start: 2017-06-15 | End: 2017-06-16 | Stop reason: HOSPADM

## 2017-06-15 RX ORDER — MAGNESIUM SULFATE 100 %
4 CRYSTALS MISCELLANEOUS AS NEEDED
Status: DISCONTINUED | OUTPATIENT
Start: 2017-06-15 | End: 2017-06-16 | Stop reason: HOSPADM

## 2017-06-15 RX ORDER — RANITIDINE 150 MG/1
150 TABLET, FILM COATED ORAL 2 TIMES DAILY
COMMUNITY
End: 2017-10-09

## 2017-06-15 RX ORDER — ONDANSETRON 4 MG/1
4 TABLET, ORALLY DISINTEGRATING ORAL
Status: DISCONTINUED | OUTPATIENT
Start: 2017-06-15 | End: 2017-06-16 | Stop reason: HOSPADM

## 2017-06-15 RX ORDER — ATROPINE SULFATE 0.1 MG/ML
0.5 INJECTION INTRAVENOUS
Status: CANCELLED | OUTPATIENT
Start: 2017-06-15 | End: 2017-06-16

## 2017-06-15 RX ORDER — FAMOTIDINE 20 MG/1
20 TABLET, FILM COATED ORAL 2 TIMES DAILY
COMMUNITY
End: 2018-07-02

## 2017-06-15 RX ORDER — LOSARTAN POTASSIUM 25 MG/1
25 TABLET ORAL DAILY
Status: DISCONTINUED | OUTPATIENT
Start: 2017-06-16 | End: 2017-06-16 | Stop reason: HOSPADM

## 2017-06-15 RX ORDER — DICYCLOMINE HYDROCHLORIDE 10 MG/1
10 CAPSULE ORAL 4 TIMES DAILY
Status: DISCONTINUED | OUTPATIENT
Start: 2017-06-15 | End: 2017-06-16 | Stop reason: HOSPADM

## 2017-06-15 RX ADMIN — SODIUM CHLORIDE 1000 ML: 900 INJECTION, SOLUTION INTRAVENOUS at 13:07

## 2017-06-15 RX ADMIN — DICYCLOMINE HYDROCHLORIDE 10 MG: 10 CAPSULE ORAL at 18:15

## 2017-06-15 RX ADMIN — SODIUM CHLORIDE 100 ML/HR: 900 INJECTION, SOLUTION INTRAVENOUS at 18:16

## 2017-06-15 RX ADMIN — METOPROLOL TARTRATE 25 MG: 25 TABLET ORAL at 21:50

## 2017-06-15 RX ADMIN — SODIUM CHLORIDE 80 MG: 9 INJECTION INTRAMUSCULAR; INTRAVENOUS; SUBCUTANEOUS at 13:08

## 2017-06-15 RX ADMIN — SODIUM CHLORIDE 8 MG/HR: 900 INJECTION INTRAVENOUS at 21:50

## 2017-06-15 RX ADMIN — MORPHINE SULFATE 2 MG: 10 INJECTION, SOLUTION INTRAVENOUS at 23:00

## 2017-06-15 RX ADMIN — SODIUM CHLORIDE 8 MG/HR: 900 INJECTION INTRAVENOUS at 13:08

## 2017-06-15 RX ADMIN — DICYCLOMINE HYDROCHLORIDE 10 MG: 10 CAPSULE ORAL at 21:50

## 2017-06-15 RX ADMIN — SODIUM CHLORIDE 8 MG/HR: 900 INJECTION INTRAVENOUS at 18:17

## 2017-06-15 RX ADMIN — MORPHINE SULFATE 2 MG: 10 INJECTION, SOLUTION INTRAVENOUS at 18:15

## 2017-06-15 NOTE — Clinical Note
Status[de-identified] Inpatient [101] Type of Bed: Medical [8] Inpatient Hospitalization Certified Necessary for the Following Reasons: 3. Patient receiving treatment that can only be provided in an inpatient setting (further clarification in H&P documentation) Admitting Diagnosis: GI bleed [[id number]] Admitting Physician: Denise Kebede [[id number]] Attending Physician: Denise Kebede [[id number]] Estimated Length of Stay: > or = to 2 Midnights Discharge Plan[de-identified] Home with Office Follow-up

## 2017-06-15 NOTE — H&P
History and Physical    Patient: Panchito Saunders               Sex: female          DOA: 6/15/2017       YOB: 1980      Age:  31 y.o. Assessment/Plan     Melena  worrysome for PUD on PPI drip  Serial H/H and monitor vitals  GI consulting. NPO    Worsening Epigastric pain - GI consult, r/o GERD/NERD, ulcer or other etiology. Possible EGD    May need pH monitoring    DM- A1c, SSI ACHS    GERD-GI to evaluate and manage. On IV PPI    Obesity -needs significant weight reduction    HLP-not on statin    Code status: Full  PPX:  DVT: SCD   GI: PPI    HPI:     Chief Complaint   Patient presents with    Epigastric Pain       Panchito Saunders is a 32 YO AAF with morbid obesity with a BMI of 46, DM, and HLP. She has chronic burning pain over the lower sternum, xiphoid area and upper epigastric region, it has been present for atleast 3 years but she feels it is worse over the last 4-5 months and moreso over the past 10 days. She had a few black stools this morning and so came to the ER. Her vitals and labs were normal in ER aside from persistent tachycardia. ROS + for nausea, anorexia, light head  Her Pain is constant and never goes away, pain does wax and wane and is worse after eating any food. She has associated intermittent acid reflux. Denies emesis. She has had a cholecystectomy in the past. She takes Pantoprazole 40 mg daily as well as pepcid and zantac. She feels the pain is worse if she stops the antacids. She had a EGD with Dr Denise Blum about 3 years ago and was told it was negative. She was seen by [facility] 5/30 Dr Karen Cassidy with plans for EGD, i believe this was scheduled for July 5th. CBC, CMP have been normal. She has had a CTA chest that was negative at [facility] 4/13/17 (s/p cholecystectomy, mild fatty liver, no evidence of PE). She had a negative stress test at [facility] 4/14/17.   She had a CT abdomen and pelvis done 4/12/17 which showed a small hiatal hernia, mild possible fatty liver. Gallbladder surgically absent. Normal pancreas and bile ducts. No acute pathology. Past Medical History:   Diagnosis Date    Diabetes ([address]     GERD (gastroesophageal reflux disease)     High cholesterol     Pancreatitis        Prior to Admission Medications   Prescriptions Last Dose Informant Patient Reported? Taking? HYDROcodone-acetaminophen (NORCO) 5-325 mg per tablet Not Taking at Unknown time  No No   Sig: Take 1 Tab by mouth every six (6) hours as needed for Pain. Max Daily Amount: 4 Tabs. LANSOPRAZOLE (PREVACID PO) Not Taking at Unknown time  Yes No   Sig: Take  by mouth. PROMETHAZINE HCL (PHENERGAN PO) 6/15/2017 at Unknown time  Yes Yes   Sig: Take  by mouth. aspirin 81 mg chewable tablet Not Taking at Unknown time  No No   Sig: Take 1 Tab by mouth daily. dicyclomine (BENTYL) 10 mg capsule 6/15/2017 at Unknown time  Yes Yes   Sig: Take 10 mg by mouth 4 times daily (before meals and nightly). famotidine (PEPCID) 20 mg tablet   Yes Yes   Sig: Take 20 mg by mouth two (2) times a day. glipiZIDE (GLUCOTROL) 10 mg tablet 6/15/2017 at Unknown time  Yes Yes   Sig: Take 10 mg by mouth two (2) times a day. losartan (COZAAR) 25 mg tablet 6/15/2017 at Unknown time  No Yes   Sig: Take 1 Tab by mouth daily. metoprolol tartrate (LOPRESSOR) 25 mg tablet Not Taking at Unknown time  No No   Sig: Take 1 Tab by mouth two (2) times a day. omeprazole (PRILOSEC) 10 mg capsule   Yes Yes   Sig: Take 10 mg by mouth daily. pantoprazole (PROTONIX) 20 mg tablet 6/15/2017 at Unknown time  Yes Yes   Sig: Take 20 mg by mouth daily. polyethylene glycol (MIRALAX) 17 gram packet Not Taking at Unknown time  Yes No   Sig: Take 17 g by mouth daily. raNITIdine (ZANTAC) 150 mg tablet   Yes Yes   Sig: Take 150 mg by mouth two (2) times a day.       Facility-Administered Medications: None       Social History:  Social History     Social History    Marital status:      Spouse name: N/A    Number of children: N/A    Years of education: N/A     Occupational History    Not on file. Social History Main Topics    Smoking status: Never Smoker    Smokeless tobacco: Not on file    Alcohol use No    Drug use: Not on file    Sexual activity: Not on file     Other Topics Concern    Not on file     Social History Narrative       Family History:  History reviewed. No pertinent family history. Surgical History:  Past Surgical History:   Procedure Laterality Date    HX APPENDECTOMY      HX CHOLECYSTECTOMY         Review of Systems  Constitutional:  No fever or weight loss  HEENT:  No headache or visual changes  Cardiovascular:  No chest pain or diaphoresis  Respiratory:  No coughing, wheezing, or shortness of breath. GI:  HPI  :  No hematuria or dysuria  Skin:  No rashes or moles  Neuro:  No seizures or syncope  Hematological:  No bruising or bleeding  Endocrine:  No diabetes or thyroid disease    Physical Exam:      Visit Vitals    /81 (BP 1 Location: Right arm, BP Patient Position: At rest)    Pulse (!) 101    Temp 98.6 °F (37 °C)    Resp 22    Ht 5' 7\" (1.702 m)    Wt 131.5 kg (290 lb)    SpO2 100%    BMI 45.42 kg/m2       Physical Exam:  Gen:  No distress, alert  HEENT:  Normal cephalic atraumatic, extra-occular movements are intact. Neck:  Supple, No JVD  Lungs:  Clear bilaterally, no wheeze, no rales, normal effort  Heart:  Regular Rate and Rhythm, normal S1 and S2, no edema  Abdomen:  Obese, Soft, non tender, normal bowel sounds, no guarding. Extremities:  Well perfused, no cyanosis or edema  Neurological:  Awake and alert, CN's are intact, normal strength throughout extremities  Skin:  No rashes or moles  Psych:  Normal thought process, does not appear anxious    Laboratory Studies: All lab results for the last 24 hours reviewed.   Recent Results (from the past 12 hour(s))   URINALYSIS W/ RFLX MICROSCOPIC    Collection Time: 06/15/17 12:24 PM   Result Value Ref Range    Color DARK YELLOW      Appearance CLEAR      Specific gravity 1.030 1.005 - 1.030      pH (UA) 8.0 5.0 - 8.0      Protein TRACE (A) NEG mg/dL    Glucose NEGATIVE  NEG mg/dL    Ketone TRACE (A) NEG mg/dL    Bilirubin NEGATIVE  NEG      Blood NEGATIVE  NEG      Urobilinogen 1.0 0.2 - 1.0 EU/dL    Nitrites NEGATIVE  NEG      Leukocyte Esterase TRACE (A) NEG     URINE MICROSCOPIC ONLY    Collection Time: 06/15/17 12:24 PM   Result Value Ref Range    WBC 0 to 3 0 - 5 /hpf    RBC 0 to 2 0 - 5 /hpf    Epithelial cells 3+ 0 - 5 /lpf    Bacteria FEW (A) NEG /hpf    Mucus 1+ (A) NEG /lpf   CBC WITH AUTOMATED DIFF    Collection Time: 06/15/17 12:25 PM   Result Value Ref Range    WBC 9.6 4.6 - 13.2 K/uL    RBC 4.48 4.20 - 5.30 M/uL    HGB 12.6 12.0 - 16.0 g/dL    HCT 37.5 35.0 - 45.0 %    MCV 83.7 74.0 - 97.0 FL    MCH 28.1 24.0 - 34.0 PG    MCHC 33.6 31.0 - 37.0 g/dL    RDW 14.3 11.6 - 14.5 %    PLATELET 863 681 - 273 K/uL    MPV 8.9 (L) 9.2 - 11.8 FL    NEUTROPHILS 75 (H) 40 - 73 %    LYMPHOCYTES 20 (L) 21 - 52 %    MONOCYTES 3 3 - 10 %    EOSINOPHILS 2 0 - 5 %    BASOPHILS 0 0 - 2 %    ABS. NEUTROPHILS 7.2 1.8 - 8.0 K/UL    ABS. LYMPHOCYTES 1.9 0.9 - 3.6 K/UL    ABS. MONOCYTES 0.3 0.05 - 1.2 K/UL    ABS. EOSINOPHILS 0.2 0.0 - 0.4 K/UL    ABS. BASOPHILS 0.0 0.0 - 0.06 K/UL    DF AUTOMATED     METABOLIC PANEL, COMPREHENSIVE    Collection Time: 06/15/17 12:25 PM   Result Value Ref Range    Sodium 142 136 - 145 mmol/L    Potassium 3.9 3.5 - 5.5 mmol/L    Chloride 104 100 - 108 mmol/L    CO2 28 21 - 32 mmol/L    Anion gap 10 3.0 - 18 mmol/L    Glucose 202 (H) 74 - 99 mg/dL    BUN 8 7.0 - 18 MG/DL    Creatinine 0.90 0.6 - 1.3 MG/DL    BUN/Creatinine ratio 9 (L) 12 - 20      GFR est AA >60 >60 ml/min/1.73m2    GFR est non-AA >60 >60 ml/min/1.73m2    Calcium 9.1 8.5 - 10.1 MG/DL    Bilirubin, total 0.4 0.2 - 1.0 MG/DL    ALT (SGPT) 39 13 - 56 U/L    AST (SGOT) 29 15 - 37 U/L    Alk.  phosphatase 90 45 - 117 U/L    Protein, total 7.4 6.4 - 8.2 g/dL    Albumin 3.6 3.4 - 5.0 g/dL    Globulin 3.8 2.0 - 4.0 g/dL    A-G Ratio 0.9 0.8 - 1.7     PROTHROMBIN TIME + INR    Collection Time: 06/15/17 12:25 PM   Result Value Ref Range    Prothrombin time 13.6 11.5 - 15.2 sec    INR 1.1 0.8 - 1.2     PTT    Collection Time: 06/15/17 12:25 PM   Result Value Ref Range    aPTT 27.7 23.0 - 36.4 SEC   HCG URINE, QL. - POC    Collection Time: 06/15/17 12:53 PM   Result Value Ref Range    Pregnancy test,urine (POC) NEGATIVE  NEG     TYPE & CROSSMATCH    Collection Time: 06/15/17  1:00 PM   Result Value Ref Range    Crossmatch Expiration 06/18/2017     ABO/Rh(D) [place] POSITIVE     Antibody screen NEG     CALLED TO: AMANDA MIGUEL ON 6/15/2017 1433 BY [facility]     Unit number [id number]     Blood component type RC LR AS1     Unit division 00     Status of unit ALLOCATED     Crossmatch result Compatible     Unit number [id number]     Blood component type RC LR AS1     Unit division 00     Status of unit ALLOCATED     Crossmatch result Compatible    GLUCOSE, POC    Collection Time: 06/15/17  5:23 PM   Result Value Ref Range    Glucose (POC) 138 (H) 70 - 110 mg/dL       Rad:  none

## 2017-06-15 NOTE — ED TRIAGE NOTES
Patient with complaints of epigastric pain and burning for 10 days associated with nausea. Sepsis Screening completed    (  )Patient meets SIRS criteria. ( x )Patient does not meet SIRS criteria.       SIRS Criteria is achieved when two or more of the following are present   Temperature < 96.8°F (36°C) or > 100.9°F (38.3°C)   Heart Rate > 90 beats per minute   Respiratory Rate > 20 breaths per minute   WBC count > 12,000 or <4,000 or > 10% bands

## 2017-06-15 NOTE — ED PROVIDER NOTES
[address]  EMERGENCY DEPARTMENT HISTORY AND PHYSICAL EXAM       Date: 6/15/2017   Patient Name: Byron Bautista   YOB: 1980  Medical Record Number: [id number]    History of Presenting Illness     Chief Complaint   Patient presents with    Epigastric Pain        History Provided By:  patient    Additional History: 12:50 PM    Byron Bautista is a 31 y.o. female with hx of HTN and DM who presents to the emergency department C/O worsening, constant, burning epigastric abd pain (rated 10/10) x 10 days. Pain is worsened by eating food. Associated sx include nausea, decreased appetite, mild dizziness, and black stools onset today. Pt has taken Zantac, Pepcid AC, and Prilosec with no relief. Pt states she stopped taking ibuprofen 1 month ago but she had previously been taking ~3 per day. No personal hx of ulcer or GI bleed. LMP was 6/3/17. Denies fever, chills, vomiting, and any other sx or complaints. Primary Care Provider: Dayanara Parrish MD   Specialist:    Past History     Past Medical History:   Past Medical History:   Diagnosis Date    Diabetes ([address]     GERD (gastroesophageal reflux disease)     High cholesterol     Pancreatitis         Past Surgical History:   Past Surgical History:   Procedure Laterality Date    HX APPENDECTOMY      HX CHOLECYSTECTOMY          Family History:   History reviewed. No pertinent family history. Social History:   Social History   Substance Use Topics    Smoking status: Never Smoker    Smokeless tobacco: None    Alcohol use No        Allergies: Allergies   Allergen Reactions    Effexor [Venlafaxine] Rash        Review of Systems   Review of Systems   Constitutional: Positive for appetite change (decreased). Negative for chills and fever. Gastrointestinal: Positive for abdominal pain (epigastric), blood in stool and nausea. Negative for vomiting. Neurological: Positive for dizziness (mild).    All other systems reviewed and are negative. Physical Exam  Vitals:    06/15/17 1227 06/15/17 1300 06/15/17 1330   BP: 145/87 130/77 122/74   Pulse: (!) 118 (!) 111 (!) 108   Resp: 20 27 26   Temp: 98.7 °F (37.1 °C)     SpO2: 100% 98% 98%   Weight: 131.5 kg (290 lb)     Height: 5' 7\" (1.702 m)         Physical Exam  Vital signs and nursing notes reviewed    CONSTITUTIONAL: Alert, in moderate pain; overweight  HEAD:  Normocephalic, atraumatic  EYES: PERRL; EOM's intact. ENTM: Nose: no rhinorrhea; Throat: no erythema or exudate, mucous membranes moist  Neck:  No JVD, supple without lymphadenopathy  RESP: Chest clear, equal breath sounds. CV:  No murmurs, gallops or rubs. Tachycardic   GI: Normal bowel sounds, abdomen soft and tenderness in epigastrium. No masses or organomegaly. : No costo-vertebral angle tenderness. BACK:  Non-tender  UPPER EXT:  Normal inspection  LOWER EXT: No edema, no calf tenderness. Distal pulses intact. NEURO: CN intact, reflexes 2/4 and sym, strength 5/5 and sym, sensation intact. SKIN: No rashes; Normal for age and stage. PSYCH:  Alert and oriented, normal affect.       Diagnostic Study Results     Labs -      Recent Results (from the past 12 hour(s))   URINALYSIS W/ RFLX MICROSCOPIC    Collection Time: 06/15/17 12:24 PM   Result Value Ref Range    Color DARK YELLOW      Appearance CLEAR      Specific gravity 1.030 1.005 - 1.030      pH (UA) 8.0 5.0 - 8.0      Protein TRACE (A) NEG mg/dL    Glucose NEGATIVE  NEG mg/dL    Ketone TRACE (A) NEG mg/dL    Bilirubin NEGATIVE  NEG      Blood NEGATIVE  NEG      Urobilinogen 1.0 0.2 - 1.0 EU/dL    Nitrites NEGATIVE  NEG      Leukocyte Esterase TRACE (A) NEG     URINE MICROSCOPIC ONLY    Collection Time: 06/15/17 12:24 PM   Result Value Ref Range    WBC 0 to 3 0 - 5 /hpf    RBC 0 to 2 0 - 5 /hpf    Epithelial cells 3+ 0 - 5 /lpf    Bacteria FEW (A) NEG /hpf    Mucus 1+ (A) NEG /lpf   CBC WITH AUTOMATED DIFF    Collection Time: 06/15/17 12:25 PM   Result Value Ref Range    WBC 9.6 4.6 - 13.2 K/uL    RBC 4.48 4.20 - 5.30 M/uL    HGB 12.6 12.0 - 16.0 g/dL    HCT 37.5 35.0 - 45.0 %    MCV 83.7 74.0 - 97.0 FL    MCH 28.1 24.0 - 34.0 PG    MCHC 33.6 31.0 - 37.0 g/dL    RDW 14.3 11.6 - 14.5 %    PLATELET 994 386 - 949 K/uL    MPV 8.9 (L) 9.2 - 11.8 FL    NEUTROPHILS 75 (H) 40 - 73 %    LYMPHOCYTES 20 (L) 21 - 52 %    MONOCYTES 3 3 - 10 %    EOSINOPHILS 2 0 - 5 %    BASOPHILS 0 0 - 2 %    ABS. NEUTROPHILS 7.2 1.8 - 8.0 K/UL    ABS. LYMPHOCYTES 1.9 0.9 - 3.6 K/UL    ABS. MONOCYTES 0.3 0.05 - 1.2 K/UL    ABS. EOSINOPHILS 0.2 0.0 - 0.4 K/UL    ABS. BASOPHILS 0.0 0.0 - 0.06 K/UL    DF AUTOMATED     METABOLIC PANEL, COMPREHENSIVE    Collection Time: 06/15/17 12:25 PM   Result Value Ref Range    Sodium 142 136 - 145 mmol/L    Potassium 3.9 3.5 - 5.5 mmol/L    Chloride 104 100 - 108 mmol/L    CO2 28 21 - 32 mmol/L    Anion gap 10 3.0 - 18 mmol/L    Glucose 202 (H) 74 - 99 mg/dL    BUN 8 7.0 - 18 MG/DL    Creatinine 0.90 0.6 - 1.3 MG/DL    BUN/Creatinine ratio 9 (L) 12 - 20      GFR est AA >60 >60 ml/min/1.73m2    GFR est non-AA >60 >60 ml/min/1.73m2    Calcium 9.1 8.5 - 10.1 MG/DL    Bilirubin, total 0.4 0.2 - 1.0 MG/DL    ALT (SGPT) 39 13 - 56 U/L    AST (SGOT) 29 15 - 37 U/L    Alk.  phosphatase 90 45 - 117 U/L    Protein, total 7.4 6.4 - 8.2 g/dL    Albumin 3.6 3.4 - 5.0 g/dL    Globulin 3.8 2.0 - 4.0 g/dL    A-G Ratio 0.9 0.8 - 1.7     PROTHROMBIN TIME + INR    Collection Time: 06/15/17 12:25 PM   Result Value Ref Range    Prothrombin time 13.6 11.5 - 15.2 sec    INR 1.1 0.8 - 1.2     PTT    Collection Time: 06/15/17 12:25 PM   Result Value Ref Range    aPTT 27.7 23.0 - 36.4 SEC   HCG URINE, QL. - POC    Collection Time: 06/15/17 12:53 PM   Result Value Ref Range    Pregnancy test,urine (POC) NEGATIVE  NEG     TYPE & CROSSMATCH    Collection Time: 06/15/17  1:00 PM   Result Value Ref Range    Crossmatch Expiration 06/18/2017     ABO/Rh(D) O POSITIVE     Antibody screen NEG CALLED TO: La Smith , ED ON 6/15/2017 1433 BY [facility]     Unit number [id number]     Blood component type RC LR AS1     Unit division 00     Status of unit ALLOCATED     Crossmatch result Compatible     Unit number [id number]     Blood component type RC LR AS1     Unit division 00     Status of unit ALLOCATED     Crossmatch result Compatible        Radiologic Studies -  The following have been ordered and reviewed:  No orders to display           Medical Decision Making   I am the first provider for this patient. I reviewed the vital signs, available nursing notes, past medical history, past surgical history, family history and social history. Vital Signs-Reviewed the patient's vital signs. Patient Vitals for the past 12 hrs:   Temp Pulse Resp BP SpO2   06/15/17 1330 - (!) 108 26 122/74 98 %   06/15/17 1300 - (!) 111 27 130/77 98 %   06/15/17 1227 98.7 °F (37.1 °C) (!) 118 20 145/87 100 %       Pulse Oximetry Analysis - Normal 100% on room air     Cardiac Monitor:   Rate: 107 bpm  Rhythm: Sinus Tachycardia      EKG interpretation: (Preliminary)  Rhythm: Sinus tachy. Rate (approx.): 113 bpm; T wave abnormality (inferior and anterolatearl) that is rate related. EKG read by Andra Decker MD at 12:46 PM    Old Medical Records: Nursing notes. Provider Notes:   INITIAL CLINICAL IMPRESSION and PLANS:  The patient presents with the primary complaint(s) of: abd pain. The presentation, to include historical aspects and clinical findings are consistent with the DX of GI bleed. However, other possible DX's to consider as primary, associated with, or exacerbated by include:    1. Anemia   2. PUD    Considering the above, my initial management plan to evaluate and therapeutic interventions include the following and as noted in the orders:    1. Labs: urine microscopy, UA, HCG urine, PTT, prothrombin time + INR, CMP, CBC  2.   Imaging: EKG       Procedures:   Procedures     PROCEDURE NOTE - RECTAL EXAM: 12:55 PM  Performed by: Fabian Smallwood MD  Rectal exam performed. Blackish stool was collected. Stool was Hemoccult tested, and found to be trace heme Positive. The procedure took 1-15 minutes, and pt tolerated well. Written by AMANDA Lawton, as dictated by Fabian Smallwood MD.    ED Course:  12:50 PM  Initial assessment performed. The patients presenting problems have been discussed, and they are in agreement with the care plan formulated and outlined with them. I have encouraged them to ask questions as they arise throughout their visit. Medications Given in the ED:  Medications   pantoprazole (PROTONIX) 40 mg in 0.9% sodium chloride (MBP/ADV) 50 mL MBP (8 mg/hr IntraVENous New Bag 6/15/17 1308)   0.9% sodium chloride infusion 250 mL (not administered)   sodium chloride 0.9 % bolus infusion 1,000 mL (1,000 mL IntraVENous New Bag 6/15/17 1307)   pantoprazole (PROTONIX) 80 mg in sodium chloride 0.9 % 20 mL injection (80 mg IntraVENous Given 6/15/17 1308)       CONSULT NOTE:   1:58 PM  Fabian Smallwood MD spoke with Dee Argueta. DO Mark   Specialty: Internal Medicine  Discussed pt's hx, disposition, and available diagnostic and imaging results. Reviewed care plans. Consulting physician agrees to admit pt to medical.  Written by AMANDA Lawton, as dictated by Fabian Smallwood MD    ADMISSION NOTE:  1:58 PM  Patient is being admitted to the hospital by Dee Argueta. DO Mark. The results of their tests and reasons for their admission have been discussed with them and/or available family. They convey agreement and understanding for the need to be admitted and for their admission diagnosis. Written by AMANDA Lawton, as dictated by Fabian Smallwood MD.    CONDITIONS ON ADMISSION:  Deep Vein Thrombosis is not present at the time of admission. Thrombosis is not present at the time of admission. Urinary Tract Infection is not present at the time of admission. Pneumonia is not present at the time of admission. MRSA is not present at the time of admission. Wound infection is not present at the time of admission. Pressure Ulcer is not present at the time of admission. CLINICAL IMPRESSION    1. Gastrointestinal hemorrhage, unspecified gastrointestinal hemorrhage type        Discussion:  Pt presents with epigastric pain x 10 days and black stools today. She was tachycardic. Her hemoglobin was stable but the pt has been using PPIs for a week and her sx have persisted. Will admit for observation and a GI consult.  _______________________________   Attestations: This note is prepared by Andrade Maldonado, acting as a Scribe for Kaya Abdi MD on 12:35 PM on 6/15/2017 . Kaya Abdi MD: The scribe's documentation has been prepared under my direction and personally reviewed by me in its entirety.   _______________________________

## 2017-06-16 VITALS
SYSTOLIC BLOOD PRESSURE: 98 MMHG | HEART RATE: 109 BPM | BODY MASS INDEX: 45.99 KG/M2 | OXYGEN SATURATION: 98 % | RESPIRATION RATE: 20 BRPM | DIASTOLIC BLOOD PRESSURE: 78 MMHG | HEIGHT: 67 IN | WEIGHT: 293 LBS | TEMPERATURE: 98.5 F

## 2017-06-16 PROBLEM — E66.01 MORBID OBESITY DUE TO EXCESS CALORIES (HCC): Status: ACTIVE | Noted: 2017-06-16

## 2017-06-16 PROBLEM — K92.1 MELENA: Status: ACTIVE | Noted: 2017-06-16

## 2017-06-16 LAB
ANION GAP BLD CALC-SCNC: 11 MMOL/L (ref 3–18)
ATRIAL RATE: 113 BPM
BUN SERPL-MCNC: 8 MG/DL (ref 7–18)
BUN/CREAT SERPL: 9 (ref 12–20)
CALCIUM SERPL-MCNC: 8.4 MG/DL (ref 8.5–10.1)
CALCULATED P AXIS, ECG09: 50 DEGREES
CALCULATED R AXIS, ECG10: 23 DEGREES
CALCULATED T AXIS, ECG11: -44 DEGREES
CHLORIDE SERPL-SCNC: 102 MMOL/L (ref 100–108)
CO2 SERPL-SCNC: 26 MMOL/L (ref 21–32)
CREAT SERPL-MCNC: 0.85 MG/DL (ref 0.6–1.3)
DIAGNOSIS, 93000: NORMAL
ERYTHROCYTE [DISTWIDTH] IN BLOOD BY AUTOMATED COUNT: 14.3 % (ref 11.6–14.5)
GLUCOSE BLD STRIP.AUTO-MCNC: 132 MG/DL (ref 70–110)
GLUCOSE BLD STRIP.AUTO-MCNC: 155 MG/DL (ref 70–110)
GLUCOSE BLD STRIP.AUTO-MCNC: 183 MG/DL (ref 70–110)
GLUCOSE SERPL-MCNC: 191 MG/DL (ref 74–99)
HCT VFR BLD AUTO: 32.2 % (ref 35–45)
HCT VFR BLD AUTO: 33.5 % (ref 35–45)
HGB BLD-MCNC: 10.9 G/DL (ref 12–16)
HGB BLD-MCNC: 11.2 G/DL (ref 12–16)
MCH RBC QN AUTO: 27.9 PG (ref 24–34)
MCHC RBC AUTO-ENTMCNC: 33.4 G/DL (ref 31–37)
MCV RBC AUTO: 83.3 FL (ref 74–97)
P-R INTERVAL, ECG05: 148 MS
PLATELET # BLD AUTO: 330 K/UL (ref 135–420)
PMV BLD AUTO: 8.9 FL (ref 9.2–11.8)
POTASSIUM SERPL-SCNC: 3.6 MMOL/L (ref 3.5–5.5)
Q-T INTERVAL, ECG07: 344 MS
QRS DURATION, ECG06: 72 MS
QTC CALCULATION (BEZET), ECG08: 471 MS
RBC # BLD AUTO: 4.02 M/UL (ref 4.2–5.3)
SODIUM SERPL-SCNC: 139 MMOL/L (ref 136–145)
VENTRICULAR RATE, ECG03: 113 BPM
WBC # BLD AUTO: 9.8 K/UL (ref 4.6–13.2)

## 2017-06-16 PROCEDURE — 80048 BASIC METABOLIC PNL TOTAL CA: CPT | Performed by: INTERNAL MEDICINE

## 2017-06-16 PROCEDURE — 85027 COMPLETE CBC AUTOMATED: CPT | Performed by: INTERNAL MEDICINE

## 2017-06-16 PROCEDURE — 74011250636 HC RX REV CODE- 250/636

## 2017-06-16 PROCEDURE — C9113 INJ PANTOPRAZOLE SODIUM, VIA: HCPCS | Performed by: FAMILY MEDICINE

## 2017-06-16 PROCEDURE — 85018 HEMOGLOBIN: CPT | Performed by: INTERNAL MEDICINE

## 2017-06-16 PROCEDURE — 76040000007: Performed by: INTERNAL MEDICINE

## 2017-06-16 PROCEDURE — 74011250636 HC RX REV CODE- 250/636: Performed by: FAMILY MEDICINE

## 2017-06-16 PROCEDURE — 0DJ08ZZ INSPECTION OF UPPER INTESTINAL TRACT, VIA NATURAL OR ARTIFICIAL OPENING ENDOSCOPIC: ICD-10-PCS | Performed by: INTERNAL MEDICINE

## 2017-06-16 PROCEDURE — 74011250636 HC RX REV CODE- 250/636: Performed by: INTERNAL MEDICINE

## 2017-06-16 PROCEDURE — 99218 HC RM OBSERVATION: CPT

## 2017-06-16 PROCEDURE — 99152 MOD SED SAME PHYS/QHP 5/>YRS: CPT

## 2017-06-16 PROCEDURE — 82962 GLUCOSE BLOOD TEST: CPT

## 2017-06-16 PROCEDURE — 36415 COLL VENOUS BLD VENIPUNCTURE: CPT | Performed by: INTERNAL MEDICINE

## 2017-06-16 PROCEDURE — 74011250637 HC RX REV CODE- 250/637: Performed by: INTERNAL MEDICINE

## 2017-06-16 PROCEDURE — 74011000258 HC RX REV CODE- 258: Performed by: FAMILY MEDICINE

## 2017-06-16 PROCEDURE — 77030011640 HC PAD GRND REM COVD -A: Performed by: INTERNAL MEDICINE

## 2017-06-16 PROCEDURE — 74011000250 HC RX REV CODE- 250: Performed by: INTERNAL MEDICINE

## 2017-06-16 RX ORDER — PANTOPRAZOLE SODIUM 20 MG/1
20 TABLET, DELAYED RELEASE ORAL 2 TIMES DAILY
Qty: 180 TAB | Refills: 1 | Status: SHIPPED
Start: 2017-06-16

## 2017-06-16 RX ORDER — NALOXONE HYDROCHLORIDE 0.4 MG/ML
0.4 INJECTION, SOLUTION INTRAMUSCULAR; INTRAVENOUS; SUBCUTANEOUS
Status: DISCONTINUED | OUTPATIENT
Start: 2017-06-16 | End: 2017-06-16 | Stop reason: HOSPADM

## 2017-06-16 RX ORDER — SODIUM CHLORIDE 9 MG/ML
1000 INJECTION, SOLUTION INTRAVENOUS CONTINUOUS
Status: DISCONTINUED | OUTPATIENT
Start: 2017-06-16 | End: 2017-06-16 | Stop reason: HOSPADM

## 2017-06-16 RX ORDER — FENTANYL CITRATE 50 UG/ML
100 INJECTION, SOLUTION INTRAMUSCULAR; INTRAVENOUS
Status: DISCONTINUED | OUTPATIENT
Start: 2017-06-16 | End: 2017-06-16 | Stop reason: HOSPADM

## 2017-06-16 RX ORDER — SODIUM CHLORIDE 9 MG/ML
100 INJECTION, SOLUTION INTRAVENOUS CONTINUOUS
Status: DISPENSED | OUTPATIENT
Start: 2017-06-16 | End: 2017-06-16

## 2017-06-16 RX ORDER — FLUMAZENIL 0.1 MG/ML
0.2 INJECTION INTRAVENOUS
Status: DISCONTINUED | OUTPATIENT
Start: 2017-06-16 | End: 2017-06-16 | Stop reason: HOSPADM

## 2017-06-16 RX ORDER — MIDAZOLAM HYDROCHLORIDE 1 MG/ML
.5-5 INJECTION, SOLUTION INTRAMUSCULAR; INTRAVENOUS
Status: DISCONTINUED | OUTPATIENT
Start: 2017-06-16 | End: 2017-06-16 | Stop reason: HOSPADM

## 2017-06-16 RX ORDER — DIPHENHYDRAMINE HYDROCHLORIDE 50 MG/ML
INJECTION, SOLUTION INTRAMUSCULAR; INTRAVENOUS AS NEEDED
Status: DISCONTINUED | OUTPATIENT
Start: 2017-06-16 | End: 2017-06-16 | Stop reason: HOSPADM

## 2017-06-16 RX ADMIN — MORPHINE SULFATE 2 MG: 10 INJECTION, SOLUTION INTRAVENOUS at 10:58

## 2017-06-16 RX ADMIN — METOPROLOL TARTRATE 25 MG: 25 TABLET ORAL at 08:23

## 2017-06-16 RX ADMIN — DICYCLOMINE HYDROCHLORIDE 10 MG: 10 CAPSULE ORAL at 17:34

## 2017-06-16 RX ADMIN — MORPHINE SULFATE 2 MG: 10 INJECTION, SOLUTION INTRAVENOUS at 04:07

## 2017-06-16 RX ADMIN — SODIUM CHLORIDE 100 ML/HR: 900 INJECTION, SOLUTION INTRAVENOUS at 02:50

## 2017-06-16 RX ADMIN — MORPHINE SULFATE 2 MG: 10 INJECTION, SOLUTION INTRAVENOUS at 15:37

## 2017-06-16 RX ADMIN — SODIUM CHLORIDE 100 ML/HR: 900 INJECTION, SOLUTION INTRAVENOUS at 17:33

## 2017-06-16 RX ADMIN — LOSARTAN POTASSIUM 25 MG: 25 TABLET ORAL at 08:23

## 2017-06-16 RX ADMIN — DICYCLOMINE HYDROCHLORIDE 10 MG: 10 CAPSULE ORAL at 08:23

## 2017-06-16 RX ADMIN — SODIUM CHLORIDE 8 MG/HR: 900 INJECTION INTRAVENOUS at 02:49

## 2017-06-16 NOTE — PROGRESS NOTES
Shift summary: pt has flat affect, only shakes head when speaking to her. Pt became npo after breakfast. No n/v noted. Pt rec'd prn morphien x1, effective.  Pt waiting for EGd at 2pm.

## 2017-06-16 NOTE — PROGRESS NOTES
2133 -  Head to toe assessment performed at this time. Pt denies any chest pain or SOB. Pt denies any numbness or tingling to extremities. Pt encouraged to manage pain and to use the incentive spirometer. Pt educated on the side effects of medications taken. Pt left with call light within reach and bed in low position. Will continue to monitor. Shift summary - Pt had an uneventful night. Pt received prn medication for pain. Pt encouraged to call for assistance and to manage pain. Passed in report that Dr. Judge Pastrana stated that pt could have food until after breakfast. Then pt must be NPO to have procedure performed in the morning.

## 2017-06-16 NOTE — PROGRESS NOTES
EGD completed     Patient received 100 mg of fentanyl, 13 mg of versed, and 50 mg of benadryl. Patient given 300 ml of NS    Topex given at 1634. Patient can have a eat and drink at 1734    Findings: Retained food, no other findings    Patient tachycardic, very anxious for the procedure.  Made the RN aware    Arouses to name and light touch

## 2017-06-16 NOTE — PROGRESS NOTES
1149: Assumed care, pt resting in bed watching TV. Alert and oriented, complains of abdominal pain but already medicated by off going nurse. NS and protonix running as ordered continues via right hand #24. NPO maintained. Call light and telephone in reach. 1300: AQ=822, pt refused coverage because she is NPO. Will continue to monitor. 1540: Complains of abd pain 8/10, medicated with morphine 2 mg iv, IVF on hold and pt off unit now to EGD via bed. 1704: Received report from EGD nurse, pt back in bed and resting. Nurse's aid at bedside obtaining vitals and blood sugar at this time. 1930: Dr. Norberto Gutierrez came and talk to pt about result of EGD, pt wanted to leave after that because her results was negative. Dr. Monika Mccauley was contacted and said per Dr. Kassandra Andrew recommendations he wants pt to stay till tomorrow because study was not completed. This nurse explained things to pt but still wanted to leave. 2015: Pt signed AMA form and waiting for her ride home. MO will be notified. 2033: Pt ambulated off unit with copy of AMA form. IV d/c'd with dressing intact. Left AMA.

## 2017-06-16 NOTE — CONSULTS
[address] [place] [address] Rd    Name:  Jennifer Grady  MR#:  [id number]  :  1980  Account #:  [de-identified]  Date of Adm:  06/15/2017  Date of Consultation:  06/15/2017      A 81-year-old female who was admitted today because of severe  epigastric pain and black stools. This patient is not a very good historian. She claims that she has been  on Protonix 20 mg daily for the past few years because of GERD. At  that time, she had an endoscopy by Dr. Amy Vera, she does not  remember the findings, she is not sure if there was something positive  or negative, I could not relocate his endoscopy report in the [facility] chart. The patient was doing relatively well with good control  of her symptoms of GERD until about 10 days ago when she started to  have severe epigastric pain that did not radiate to the back. The pain  was constant, but did not wake her up at night; however, it was more  severe in the morning on awakening. It was partially and temporarily  relieved with food intake, but than 30 minutes after the pain started to  get more severe, up to 10/10. She has been having some nausea, but  no vomiting. She used to take aspirin and ibuprofen, but quit about a  month and a half ago. She does not smoke or drink alcohol. No other  aspirin. She just take baby aspirin once a day since December  because of apparently coronary artery disease and angina. The pain  was described as severe, it did not radiate to the chest or to the back. She did not try to take any antacids. She claimed that this morning she  had one formed black tarry stool, but subsequently she has green  liquid non-black stools. She denies having rectal bleeding. Her weight  has been relatively stable or lost a few pounds. PAST MEDICAL HISTORY: Remarkable for diabetes mellitus for about  2 years. She has laparoscopic cholecystectomy in  for  pancreatitis.  She has hypertension, coronary artery disease (question  jose). She has 4 C-sections, possibly hysterectomy. FAMILY HISTORY: No family history of cancer. FUNCTIONAL INQUIRY: She denies having any syncopal episode,  dizziness, lightheadedness, headaches. No chest pain. No dysuria,  hematuria, or burning sensation, no dark urine. PHYSICAL EXAMINATION  GENERAL: We have a 71-year-old morbidly obese female who  appears to be in no distress. She was sitting up and watching TV in her  room. VITAL SIGNS: She weighs 290 pounds, temperature 98.7. Pulse is 98,  but before it was up to 118 on arrival. Normal blood pressure 118/75,  breathing 20, saturation 98%. SKIN: Normal. No evidence of any rash. HEENT: Eyes are unremarkable. The pupils are equal and reactive to  light. The sclerae are anicteric. The conjunctivae are pink. Oropharyngeal cavity: She has many missing front upper teeth, but the  mucous membranes are moist and pink. NECK: Supple. No palpable mass, no enlarged thyroid. LUNGS: Clear to auscultation. CARDIAC: Rhythm is regular. S1, S2 normal. No murmur. ABDOMEN: Huge, protuberant, rounded. There is no obvious mass or  organomegaly. No tenderness. Bowel sounds are slightly increased. EXTREMITIES: Normal. No pedal edema, no clubbing, no tremor. NEUROLOGIC: No focal neurological signs. She is alert and oriented. LABORATORY DATA: Hemoglobin of 12.6 yesterday and 11.5 after  hydration, was 12.1 on April 13th. Normal MCV and MCH, platelets 977  with 96% neutrophils and WBC 9.6. Complete metabolic panel was  normal except for a glucose of 202. Hemoglobin A1c of 6.9. CT of the  thorax of 04/13/2017 was negative. She had also CT scan of the  abdomen and pelvis on 04/12/2017 at [facility] that did not show any  specific pathology. CONCLUSION: This is a 71-year-old morbidly obese female who had  a previous cholecystectomy for pancreatitis.  Has been complaining in  the past 10 days of severe epigastric pain that appears to be increased  shortly after eating food. The pain is partially and temporarily relieved  by food intake. She also has been taking Protonix 20 mg daily for  chronic GERD since December 2017. I doubt strongly that she has a  peptic ulcer disease with Protonix on board, although it is not the  highest dose. She said that 3 days ago, she started to take all sorts of  PPI, simple and combination, with or without Zantac, but she did not  see any difference. For now, I think it is reasonable to check her stomach, but if this  negative, then we have to do a CAT scan, although I noticed that in  [facility] she did have a CAT scan on April 12, 2017 that was  completely normal, except for status post cholecystectomy. For now, the pain has improved by 50%. She has been on Protonix IV  drip. I doubt strongly that she has an upper GI bleeding because her BUN  remained normal and also shortly after she had a normal bowel  movement. This patient is morbidly obese, has a huge BMI. She has hypertension. She has moderately controlled diabetes mellitus. Advised the patient to  watch her diet and lose some weight. Further note will follow. MD SHERI Prado / JHONY  D:  06/15/2017   22:12  T:  06/15/2017   23:31  Job #:  [id number]    Dr. Norman Mckeon

## 2017-06-16 NOTE — ROUTINE PROCESS
Bedside and Verbal shift change report given to Yesenia Winn RN (oncoming nurse) by SAWYER Baires RN (offgoing nurse). Report included the following information SBAR, Kardex, Intake/Output, MAR and Recent Results.

## 2017-06-16 NOTE — PROCEDURES
[facility]  Esophagogastroduodenoscopy Procedure Report  _______________________________________________________  Patient: Amy Hicks                                         Attending Physician: Steph Franklin MD    Patient ID: [id number]                                      Referring Physician: Bryan Joseph MD    Exam Date: June 16, 2017 _______________________________________________________      Introduction: A  31 y.o. female patient, presents for inpatient Esophagogastroduodenoscopy Procedure. Indication: 32 yo diabetic for 2 years on Metformin. Has been c/o Severe constant epigastric pain graded as 10/10 x 11 days increased within 30 minutes after eating with constant nausea but no vomiting. She has been on Protonix 20 mg daily x 5 years. She added more OTC PPI and Zantac in the last 3 days but no improvement of her symptoms. Already had cholecystectomy with cholangiogram in 10/6/ 2013 for pancreatitis, had appendectomy for acute appendicitis in August 2014. She had a negative CT scan of the abdomen and pelvis in [facility]? 2 months ago 4, 12, 2017. normal Lab. : Steph Franklin MD    Sedation:    Versed 13 mg IV, fentanyl 100 mcg IV, topical Hurricaine spray, Benadryl 50 mg IV    Procedure Details:  After infomed consent was obtained for the procedure, with all risks and benefits of procedure explained the patient was taken to the endoscopy suite and placed in the left lateral decubitus position. Following sequential administration of sedation as per above, the endoscope was inserted into the mouth and advanced under direct vision to fourth portion of the duodenum. A careful inspection was made as the gastroscope was withdrawn, including a retroflexed view of the proximal stomach; findings and interventions are described below.       Findings:   HYPOPHARYNX AND LARYNX: Normal. High gag reflex and high tolerance to sedation  Esophagus: Normal proximal, middle and distal esophagus. no Hiatal hernia. Diaphragmatic pinch located at 42 cm. Stomach: Small amount of food or liquid retention she ate light diet 7 hours ago at 9 am. Normal, cardia, fundus, body, lesser curvature, incisura, antrum and pylorus. Mucosa is normal.  Duodenum/jejunum: The bulb, second, third, fourth portions and major papilla are normal     Therapies:    none    Specimen:   none           Complications:   None    EBL:  None  IMPRESSION: VERY MILD DIABETIC GASTROPARESIS AND HIGH TOLERANCE TO SEDATION. Recommendations: -Continue acid suppression. , -full liquid diet for now.  Do gastric empty study After this we can try a small dose of Reglan 5 mg before meals and at bed time -Follow up with me.,   Assistant: Jeaneth Abebe MD  6/16/2017  4:52 PM

## 2017-06-16 NOTE — ROUTINE PROCESS
Bedside and Verbal shift change report given to ROLANDO Griffiths RN (oncoming nurse) by Nancy Black (offgoing nurse). Report included the following information SBAR, Kardex, Intake/Output and MAR.

## 2017-06-16 NOTE — PROGRESS NOTES
conducted an initial consultation and Spiritual Assessment for Lili Norman, who is a 31 y.o.,female. According to the patients chart Quaker Affiliation is: No Voodoo. The reason the Patient came to the hospital is:   Patient Active Problem List    Diagnosis Date Noted    Melena 06/16/2017    Morbid obesity due to excess calories ([facility] [address] 06/16/2017    GI bleed 06/15/2017    GI bleed due to NSAIDs 06/15/2017    Chest pain 04/13/2017    Bilateral calf pain 04/13/2017    Controlled type 2 diabetes mellitus with oral complication (HCC) 89/02/4732    Acid reflux disease 04/13/2017    HTN (hypertension) 04/13/2017        The  provided the following Interventions:  Initiated a relationship of care and support. Listened empathically. Provided information about Spiritual Care Services. Offered prayer and assurance of continued prayers on patients behalf. Chart reviewed. The following outcomes were achieved:   confirmed Patient's Quaker Affiliation. Patient expressed gratitude for Spiritual Care visit. Assessment:  There are no significant spiritual or Tenriism issues which require further intervention at this time. Patient does not have any Tenriism or cultural needs that will affect patients preferences in health care. Plan:  Chaplains will continue to follow and will provide pastoral care as needed or requested.  recommends bedside caregivers page  on duty if patient shows signs of acute spiritual or emotional distress. [address], M.Div.   Board Certified   [phone] - Office

## 2017-06-16 NOTE — PROGRESS NOTES
Patient alert and awake with c/o pain 7/10 to abdomen. PRN meds given as ordered. Assessment complete. Call light in reach, safety and comfort measures are in place.

## 2017-06-16 NOTE — ROUTINE PROCESS
Bedside shift change report given to Simon Joseph RN (oncoming nurse) by Kristel Schmid RN   (offgoing nurse). Report included the following information SBAR, Kardex, Intake/Output, MAR and Recent Results.

## 2017-06-16 NOTE — PROGRESS NOTES
Shift Summary:  Patient educated on EGD in a.m. Patient agreed and signed consent and on chart. Educated patient that she will be allowed to have breakfast in a.m., but after cannot have anything to eat or drink. Patient required Morphine 2 mg IV overnight for abdominal pain rating at 9/10, which provided relief. Preop checklist started.

## 2017-06-16 NOTE — PROGRESS NOTES
Daily Progress Note: 6/16/2017 10:55 AM   Admit Date: 6/15/2017    Patient seen in follow up for multiple medical problems as listed below:  Patient Active Problem List   Diagnosis Code    Chest pain R07.9    Bilateral calf pain M79.661, M79.662    Controlled type 2 diabetes mellitus with oral complication (HCC) Q58.883    Acid reflux disease K21.9    HTN (hypertension) I10    GI bleed K92.2    GI bleed due to NSAIDs K92.2, T39.395A    Melena K92.1    Morbid obesity due to excess calories (HCC) E66.01       Assesment     30 YO AAF with morbid obesity with a BMI of 46, DM, and HLP. She has chronic burning pain over the lower sternum, xiphoid area and upper epigastric region, it has been present for atleast 3 years but she feels it is worse over the last 4-5 months and moreso over the past 10 days. She had a few black stools this morning and so came to the ER. Her vitals and labs were normal in ER aside from persistent tachycardia. Admitted for potential EGD. She has followed with [facility]    Transient Melena   GI consulting. NPO for potential EGD     Worsening Epigastric pain -   GI consulting. Pain present for years. Pain is not impressive  Patient was not forthcoming with pior workups or [facility] plans. Needs weight loss,      DM- A1c, SSI ACHS     GERD-GI to evaluate and manage. On IV PPI     Obesity -needs significant weight reduction     HLP-not on statin    DVT Protocol Active: yes  Code Status:  Full Code     Disposition: possible dc today/tomorrow    Subjective:     CC: Epigastric Pain    Interval History: pain is reportedly unchanged from last night however unconvincing. Patient seems bothered that i am asking her about recent GI workup and plans with [facility].  Planned EGD this afternoon     ROS: 11 point ROS unchanged from H&P 6/15    Objective:     Visit Vitals    /74 (BP 1 Location: Right arm, BP Patient Position: At rest)    Pulse 88    Temp 97.6 °F (36.4 °C)    Resp 18    Ht 5' 7\" (1.702 m)    Wt 136.9 kg (301 lb 13 oz)    LMP 2017    SpO2 98%    BMI 47.27 kg/m2       Temp (24hrs), Av.3 °F (36.8 °C), Min:97.6 °F (36.4 °C), Max:98.7 °F (37.1 °C)        Intake/Output Summary (Last 24 hours) at 17 1055  Last data filed at 17 0707   Gross per 24 hour   Intake             1278 ml   Output             1475 ml   Net             -197 ml       Gen: AOx3, NAD  HEENT:  KILO, EOMI. Neck: No Bruits/JVD   Lungs:   CTAB. Good respiratory effort  Heart:   RR S1 S2 without M/R/G  Abdomen: ND,obese, BSX4, mild epigastric tenderness with pressure  Extremities:   No LE edema. No cyanosis. Skin:  no jaundice/lesions      Data Review:     Meds/Labs/Tests reviewed    Current Shift:  701 - 1900  In: 0   Out: 600 [Urine:600]  Last three shifts:  1901 -  0700  In: 8787 [P.O.:350;  I.V.:928]  Out: 875 [Urine:875]  Recent Labs      06/16/17   0310  06/15/17   2030  06/15/17   1225   WBC  9.8   --   9.6   RBC  4.02*   --   4.48   HGB  11.2*  11.5*  12.6   HCT  33.5*  34.1*  37.5   PLT  330   --   388   GRANS   --    --   75*   LYMPH   --    --   20*   EOS   --    --   2       Recent Labs      17   0310  06/15/17   1225   BUN  8  8   CREA  0.85  0.90   CA  8.4*  9.1   ALB   --   3.6   K  3.6  3.9   NA  139  142   CL  102  104   CO2  26  28   GLU  191*  202*        Lab Results   Component Value Date/Time    Glucose 191 2017 03:10 AM    Glucose 202 06/15/2017 12:25 PM    Glucose 140 2017 12:09 PM          Care coordination with Nursing/Consultants/staff: 10  Prior history, labs, and charting reviewed: 10    Procedures/Imaging:  EGD     Total time spent with chart review, patient examination/education, discussion with staff on case,documentation and medication management / adjustment  :  30 Minutes      Dr Chasity Ontiveros  Pager: [phone]

## 2017-06-16 NOTE — PROGRESS NOTES
Pt admitted due to GI bleed. Met with pt at bedside and she has indicated she is independent and has assistance from family when discharged. No plan of care needs have been identified at this time. CM available to assist as needed. Readmission Risk Assessment:     Moderate Risk and MSSP/Good Help ACO patients    RRAT Score:  13-20    Initial Assessment: physician follow up     Emergency Contact:  spouse    Pertinent Medical Hx:    Diabetes, GERD,high cholesterol, pancreatitis     PCP/Specialists: [facility]      Community Services:   None    DME:      None    Moderate Risk Care Transition Plan:  1. Evaluate for [facility] or [facility], SNF, acute rehab, community care coordination of resources. 2. Involve patient/caregiver in assessment, planning, education and implement of intervention. 3. CM daily patient care huddles/interdisciplinary rounds. 4. PCP/Specialist appointment within 5  7 days made prior to discharge. 5. Facilitate transportation and logistics for follow-up appointments. 6. Medication reconciliation [address]  7. Formal handoff between hospital provider and post-acute provider to transition patient  Handoff to [facility] Nurse Navigator or PCP practice. Care Management Interventions  PCP Verified by CM: Yes  Mode of Transport at Discharge:  Other (see comment) (Family)  Transition of Care Consult (CM Consult): Discharge Planning  Health Maintenance Reviewed: Yes  Current Support Network: Lives with Spouse  Confirm Follow Up Transport: Family  Plan discussed with Pt/Family/Caregiver: Yes  Discharge Location  Discharge Placement: Home

## 2017-06-17 NOTE — DISCHARGE SUMMARY
[facility]  Hospitalist Division    Discharge Summary      Patient: Zeus Dalton MRN: [id number]  CSN: [id number]    YOB: 1980  Age: 31 y.o. Sex: female    DOA: 6/15/2017 LOS:  LOS: 1 day   Discharge Date: 06/16/17     PCP:  Ede Downs MD    Chief Complaint:    Chief Complaint   Patient presents with    Epigastric Pain     GI bleed due to NSAIDs    Admission Diagnosis:   Hospital Problems as of 6/16/2017  Never Reviewed          Codes Class Noted - Resolved POA    Melena ICD-10-CM: K92.1  ICD-9-CM: 578.1  6/16/2017 - Present Unknown        Morbid obesity due to excess calories ([facility] ICD-10-CM: E66.01  ICD-9-CM: 278.01  6/16/2017 - Present Unknown        GI bleed ICD-10-CM: K92.2  ICD-9-CM: 578.9  6/15/2017 - Present         * (Principal)GI bleed due to NSAIDs ICD-10-CM: K92.2, T39.395A  ICD-9-CM: 578.9, E935.8  6/15/2017 - Present Unknown        Chest pain ICD-10-CM: R07.9  ICD-9-CM: 786.50  4/13/2017 - Present Yes        Controlled type 2 diabetes mellitus with oral complication ([facility] [address] UID-47-LR: E11.638  ICD-9-CM: 250.80  4/13/2017 - Present Yes        HTN (hypertension) ICD-10-CM: I10  ICD-9-CM: 401.9  4/13/2017 - Present Yes              Discharge Diagnoses:    Transient Melena   GI consulting. NPO for potential EGD      Worsening Epigastric pain -   GI consulting. Pain present for years. Pain is not impressive  Patient was not forthcoming with pior workups or [facility] plans. Needs weight loss, possible gastroparesis      DM- A1c, SSI ACHS      GERD-GI to evaluate and manage. On IV PPI      Obesity -needs significant weight reduction      HLP-not on statin    Hospital Course:   30 YO AAF with morbid obesity with a BMI of 46, DM, and HLP.  She has chronic burning pain over the lower sternum, xiphoid area and upper epigastric region, it has been present for atleast 3 years but she feels it is worse over the last 4-5 months and moreso over the past 10 days. She had a few black stools in morning and so came to the ER. Her vitals and labs were normal in ER aside from persistent tachycardia. Admitted for potential EGD. She has followed with [facility]. She underwent EGD on 6/16 with Dr Moy Pandey and no ulcer was present, only the appearance of diabetic gastroparesis as evidenced by retained food in stomach. She was to stay for further gastric emptying study of the weekend however she left against informed medical plan. Significant Diagnostic Studies:  EGD 6/16- VERY MILD DIABETIC GASTROPARESIS AND HIGH TOLERANCE TO SEDATION. Consults:  Treatment Team: Consulting Provider: Leobardo Martinez MD; Utilization Review: Xavier Norris RN    Operative Procedures:  above    Disposition:  Home    Diet:  Diabetic low calorie    Discharge Condition:   Good    Discharge Medications:    Discharge Medication List as of 6/16/2017  8:51 PM      CONTINUE these medications which have NOT CHANGED    Details   raNITIdine (ZANTAC) 150 mg tablet Take 150 mg by mouth two (2) times a day., Historical Med      famotidine (PEPCID) 20 mg tablet Take 20 mg by mouth two (2) times a day., Historical Med      omeprazole (PRILOSEC) 10 mg capsule Take 10 mg by mouth daily. , Historical Med      aspirin 81 mg chewable tablet Take 1 Tab by mouth daily. , Print, Disp-30 Tab, R-0      losartan (COZAAR) 25 mg tablet Take 1 Tab by mouth daily. , Print, Disp-30 Tab, R-0      metoprolol tartrate (LOPRESSOR) 25 mg tablet Take 1 Tab by mouth two (2) times a day., Print, Disp-60 Tab, R-0      glipiZIDE (GLUCOTROL) 10 mg tablet Take 10 mg by mouth two (2) times a day., Historical Med      dicyclomine (BENTYL) 10 mg capsule Take 10 mg by mouth 4 times daily (before meals and nightly). , Historical Med      HYDROcodone-acetaminophen (NORCO) 5-325 mg per tablet Take 1 Tab by mouth every six (6) hours as needed for Pain.  Max Daily Amount: 4 Tabs., Print, Disp-16 Tab, R-0      pantoprazole (PROTONIX) 20 mg tablet Take 20 mg by mouth daily. , Historical Med      LANSOPRAZOLE (PREVACID PO) Take  by mouth., Historical Med      PROMETHAZINE HCL (PHENERGAN PO) Take  by mouth., Historical Med      polyethylene glycol (MIRALAX) 17 gram packet Take 17 g by mouth daily. , Historical Med               Follow-Up And Discharge Instructions:    Follow-up Information     Follow up With Details Comments [address]  Schedule an appointment as soon as possible for a visit              Wound Care:   N/A      Dr Dorina Muñoz        Time Spent:  35min    Cc: Pantera Lara MD

## 2017-06-17 NOTE — DISCHARGE INSTRUCTIONS
Body Mass Index: Care Instructions  Your Care Instructions    Body mass index (BMI) can help you see if your weight is raising your risk for health problems. It uses a formula to compare how much you weigh with how tall you are. · A BMI lower than 18.5 is considered underweight. · A BMI between 18.5 and 24.9 is considered healthy. · A BMI between 25 and 29.9 is considered overweight. A BMI of 30 or higher is considered obese. If your BMI is in the normal range, it means that you have a lower risk for weight-related health problems. If your BMI is in the overweight or obese range, you may be at increased risk for weight-related health problems, such as high blood pressure, heart disease, stroke, arthritis or joint pain, and diabetes. If your BMI is in the underweight range, you may be at increased risk for health problems such as fatigue, lower protection (immunity) against illness, muscle loss, bone loss, hair loss, and hormone problems. BMI is just one measure of your risk for weight-related health problems. You may be at higher risk for health problems if you are not active, you eat an unhealthy diet, or you drink too much alcohol or use tobacco products. Follow-up care is a key part of your treatment and safety. Be sure to make and go to all appointments, and call your doctor if you are having problems. It's also a good idea to know your test results and keep a list of the medicines you take. How can you care for yourself at home? · Practice healthy eating habits. This includes eating plenty of fruits, vegetables, whole grains, lean protein, and low-fat dairy. · If your doctor recommends it, get more exercise. Walking is a good choice. Bit by bit, increase the amount you walk every day. Try for at least 30 minutes on most days of the week. · Do not smoke. Smoking can increase your risk for health problems. If you need help quitting, talk to your doctor about stop-smoking programs and medicines. These can increase your chances of quitting for good. · Limit alcohol to 2 drinks a day for men and 1 drink a day for women. Too much alcohol can cause health problems. If you have a BMI higher than 25  · Your doctor may do other tests to check your risk for weight-related health problems. This may include measuring the distance around your waist. A waist measurement of more than 40 inches in men or 35 inches in women can increase the risk of weight-related health problems. · Talk with your doctor about steps you can take to stay healthy or improve your health. You may need to make lifestyle changes to lose weight and stay healthy, such as changing your diet and getting regular exercise. If you have a BMI lower than 18.5  · Your doctor may do other tests to check your risk for health problems. · Talk with your doctor about steps you can take to stay healthy or improve your health. You may need to make lifestyle changes to gain or maintain weight and stay healthy, such as getting more healthy foods in your diet and doing exercises to build muscle. Where can you learn more? Go to http://bambi-carmelina.info/. Enter S176 in the search box to learn more about \"Body Mass Index: Care Instructions. \"  Current as of: January 23, 2017  Content Version: 11.2  © 7847-8804 [employer]. Care instructions adapted under license by [employer] (which disclaims liability or warranty for this information). If you have questions about a medical condition or this instruction, always ask your healthcare professional. [place] [address] any warranty or liability for your use of this information.

## 2017-06-19 LAB
ABO + RH BLD: NORMAL
BLD PROD TYP BPU: NORMAL
BLD PROD TYP BPU: NORMAL
BLOOD GROUP ANTIBODIES SERPL: NORMAL
BPU ID: NORMAL
BPU ID: NORMAL
CALLED TO:,BCALL1: NORMAL
CROSSMATCH RESULT,%XM: NORMAL
CROSSMATCH RESULT,%XM: NORMAL
H PYLORI IGA SER-ACNC: <9 UNITS (ref 0–8.9)
H PYLORI IGG SER IA-ACNC: <0.9 U/ML (ref 0–0.8)
SPECIMEN EXP DATE BLD: NORMAL
STATUS OF UNIT,%ST: NORMAL
STATUS OF UNIT,%ST: NORMAL
UNIT DIVISION, %UDIV: 0
UNIT DIVISION, %UDIV: 0

## 2017-10-09 ENCOUNTER — HOSPITAL ENCOUNTER (EMERGENCY)
Age: 37
Discharge: LEFT BEFORE DISCHARGE | End: 2017-10-10
Attending: EMERGENCY MEDICINE
Payer: MEDICAID

## 2017-10-09 VITALS
HEIGHT: 67 IN | BODY MASS INDEX: 45.52 KG/M2 | HEART RATE: 104 BPM | WEIGHT: 290 LBS | OXYGEN SATURATION: 100 % | TEMPERATURE: 98.3 F | SYSTOLIC BLOOD PRESSURE: 156 MMHG | DIASTOLIC BLOOD PRESSURE: 90 MMHG | RESPIRATION RATE: 12 BRPM

## 2017-10-09 DIAGNOSIS — R53.82 CHRONIC FATIGUE: ICD-10-CM

## 2017-10-09 DIAGNOSIS — E11.9 TYPE 2 DIABETES MELLITUS WITHOUT COMPLICATION, UNSPECIFIED LONG TERM INSULIN USE STATUS: ICD-10-CM

## 2017-10-09 DIAGNOSIS — R42 DIZZINESS, NONSPECIFIC: ICD-10-CM

## 2017-10-09 DIAGNOSIS — G44.209 ACUTE NON INTRACTABLE TENSION-TYPE HEADACHE: Primary | ICD-10-CM

## 2017-10-09 DIAGNOSIS — I10 HYPERTENSION, UNSPECIFIED TYPE: ICD-10-CM

## 2017-10-09 PROCEDURE — 81001 URINALYSIS AUTO W/SCOPE: CPT

## 2017-10-09 PROCEDURE — 99283 EMERGENCY DEPT VISIT LOW MDM: CPT

## 2017-10-09 RX ORDER — ASCORBIC ACID 250 MG
TABLET ORAL
COMMUNITY

## 2017-10-09 RX ORDER — GLUCOSAMINE SULFATE 1500 MG
POWDER IN PACKET (EA) ORAL DAILY
COMMUNITY

## 2017-10-09 RX ORDER — CALCIUM POLYCARBOPHIL 625 MG
625 TABLET ORAL DAILY
COMMUNITY
End: 2018-07-02

## 2017-10-10 LAB
APPEARANCE UR: ABNORMAL
BACTERIA URNS QL MICRO: ABNORMAL /HPF
BILIRUB UR QL: NEGATIVE
CAOX CRY URNS QL MICRO: ABNORMAL
COLOR UR: ABNORMAL
EPITH CASTS URNS QL MICRO: ABNORMAL /LPF (ref 0–5)
GLUCOSE UR STRIP.AUTO-MCNC: NEGATIVE MG/DL
HGB UR QL STRIP: ABNORMAL
KETONES UR QL STRIP.AUTO: NEGATIVE MG/DL
LEUKOCYTE ESTERASE UR QL STRIP.AUTO: NEGATIVE
MUCOUS THREADS URNS QL MICRO: ABNORMAL /LPF
NITRITE UR QL STRIP.AUTO: POSITIVE
PH UR STRIP: 5 [PH] (ref 5–8)
PROT UR STRIP-MCNC: 100 MG/DL
RBC #/AREA URNS HPF: ABNORMAL /HPF (ref 0–5)
SP GR UR REFRACTOMETRY: >1.03 (ref 1–1.03)
UROBILINOGEN UR QL STRIP.AUTO: 1 EU/DL (ref 0.2–1)
WBC URNS QL MICRO: ABNORMAL /HPF (ref 0–5)

## 2017-10-10 NOTE — ED TRIAGE NOTES
Reports headache and dizziness for one month. Worse in last two days. Has appt with PCP tomorrow. Also reports fatigue and dizziness. NAD at triage. Sepsis Screening completed    (  )Patient meets SIRS criteria. ( x )Patient does not meet SIRS criteria.       SIRS Criteria is achieved when two or more of the following are present   Temperature < 96.8°F (36°C) or > 100.9°F (38.3°C)   Heart Rate > 90 beats per minute   Respiratory Rate > 20 breaths per minute   WBC count > 12,000 or <4,000 or > 10% bands

## 2017-10-10 NOTE — ED NOTES
Pt was evaluated, physical exam performed, plan of care for CBC/BMP and UA for acute evaluation of anemia/hydration status as pt has endocrinologist whom she saw today and has a PCP annual lab work up tomorrow morning which would be deemed appropriate for further evaluation of her chronic fatigue if these acute issues were ruled out as the cause. Pt expressed understanding and was agreeable to care, physical exam performed and tenative dispo plan discussed in advance. Pt eloped when RN returned to room for lab draw. I personally attempted to call pt (no answer). Left message on pts VM.

## 2017-10-10 NOTE — ED PROVIDER NOTES
[address]  EMERGENCY DEPARTMENT HISTORY AND PHYSICAL EXAM       Date: 10/9/2017   Patient Name: Montse Donahue   YOB: 1980  Medical Record Number: [id number]    History of Presenting Illness     Chief Complaint   Patient presents with    Dizziness    Headache    Nausea        History Provided By:  patient    Additional History: 11:51 PM   Montse Donahue is a 31 y.o. female who presents to the emergency department C/O constant HA onset one month ago. Associated sxs include n/v secondary to HA, dizziness and fatigue. Pt states she gets HA everyday but has never been dx'd with migraines. Pt saw endocrinologist this morning who did not do blood work. Pt see endocrinology for DM management. Pt has PCP appointment tomorrow. Pt has rx change two weeks ago. Pt has not had blood work in months. Pt has had increase sleep since start of sxs, average 8 hours a night. Pt states she is not stressed. Pt was seen ophthalmology a few months ago and did not have any changes. LNMP: today. PMHx includes DM, HLD, GERD, pancreatitis and gastroparesis. PSHx includes cholecystectomy and appendectomy. Pt denies congestion, anxious, photophobia, urine frequency, PMHx of HTN, thyroid and any other symptoms or complaints. Written by AMANDA Raman, as dictated by Sydney Morrissey PA-C       Primary Care Provider: Ibis Matt MD   Specialist:    Past History     Past Medical History:   Past Medical History:   Diagnosis Date    Diabetes ([address]     Gastroparesis     GERD (gastroesophageal reflux disease)     High cholesterol     Pancreatitis         Past Surgical History:   Past Surgical History:   Procedure Laterality Date    HX APPENDECTOMY      HX CHOLECYSTECTOMY          Family History:   History reviewed. No pertinent family history.      Social History:   Social History   Substance Use Topics    Smoking status: Never Smoker    Smokeless tobacco: None    Alcohol use No        Allergies: Allergies   Allergen Reactions    Effexor [Venlafaxine] Rash        Review of Systems   Review of Systems   Constitutional: Positive for fatigue. HENT: Negative for congestion. Eyes: Negative for photophobia. Gastrointestinal: Positive for nausea and vomiting. Genitourinary: Negative for frequency. Neurological: Positive for dizziness and headaches (constant). Psychiatric/Behavioral: The patient is not nervous/anxious. All other systems reviewed and are negative. Physical Exam  Vitals:    10/09/17 2337   BP: 156/90   Pulse: (!) 104   Resp: 12   Temp: 98.3 °F (36.8 °C)   SpO2: 100%   Weight: 131.5 kg (290 lb)   Height: 5' 7\" (1.702 m)       Physical Exam   Constitutional: She is oriented to person, place, and time. Vital signs are normal. She appears well-developed and well-nourished. No distress. Pt is obese, sitting up at edge of bed, NAD, depressed mood/flat affect   HENT:   Head: Normocephalic and atraumatic. Eyes: Conjunctivae and EOM are normal. Pupils are equal, round, and reactive to light. Neck: Normal range of motion. Neck supple. Cardiovascular: Normal rate, regular rhythm and normal heart sounds. Pulmonary/Chest: Effort normal and breath sounds normal. No respiratory distress. She has no wheezes. She has no rales. She exhibits no tenderness. Abdominal: Soft. Bowel sounds are normal. She exhibits no distension. There is no tenderness. There is no rebound and no guarding. Musculoskeletal: Normal range of motion. Neurological: She is alert and oriented to person, place, and time. No cranial nerve deficit. She exhibits normal muscle tone. Coordination normal.   Skin: Skin is warm and dry. She is not diaphoretic. Psychiatric: Judgment and thought content normal. Her speech is delayed. She exhibits a depressed mood. Pt presents with flat affect, delayed in responding to questions/depressed mood, staring off to side and avoiding eye contact. She is inattentive. Nursing note and vitals reviewed. Diagnostic Study Results     Labs -    No results found for this or any previous visit (from the past 12 hour(s)). Radiologic Studies -  The following have been ordered and reviewed:  No orders to display           Medical Decision Making   I am the first provider for this patient. I reviewed the vital signs, available nursing notes, past medical history, past surgical history, family history and social history. Vital Signs-Reviewed the patient's vital signs. Patient Vitals for the past 12 hrs:   Temp Pulse Resp BP SpO2   10/09/17 2337 98.3 °F (36.8 °C) (!) 104 12 156/90 100 %       Pulse Oximetry Analysis - Normal 100% on RA     Cardiac Monitor:   Rate: 104 bpm  Rhythm: Sinus Tachycardia     Old Medical Records: Nursing notes. Provider Notes:      Procedures:   Procedures    ED Course:  11:51 PM  Initial assessment performed. The patients presenting problems have been discussed, and they are in agreement with the care plan formulated and outlined with them. I have encouraged them to ask questions as they arise throughout their visit. Medications Given in the ED:  Medications - No data to display    Eloped Note:  12:18 AM  Pt has eloped form THE [facility] ED at this time. Pt and/or family has declined the need to stay. We are unable to obtain AMA paperwork due to the pt leaving without warning. Patient is of sound mind. Written by Jayne Gutiérrez ED Scribe, as dictated by Chencho Gonsalves PA-C . Diagnosis   Clinical Impression:   1. Acute non intractable tension-type headache    2. Dizziness, nonspecific    3. Chronic fatigue    4. Hypertension, unspecified type    5.  Type 2 diabetes mellitus without complication, unspecified long term insulin use status (HCC)       Discussion:   Pt was evaluated, physical exam performed, plan of care for CBC/BMP and UA for acute evaluation of anemia/hydration status in regards to her primary complaint of daily fatigue, headache, and intermittent dizziness associated with same, as pt has endocrinologist whom she saw today and has a PCP annual lab work up tomorrow morning which would be appropriate for further evaluation of her chronic issues if these acute issues are ruled out as the cause at [facility] ED visit. Pt expressed understanding and was agreeable to plan of care, verbally confirmed all questions and concerns were addressed, a physical exam was performed and tenative dispo plan discussed in advance. Pt eloped when RN returned to room for lab draw. I personally attempted to call pt (no answer). Left message on pts VM. Follow-up Information     Follow up With Details Comments Contact Angela Giposn MD Go today keep your appointment with your primary care follow up  [id number] [address] [place] [address] EMERGENCY DEPT Schedule an appointment as soon as possible for a visit As needed, If symptoms worsen [address] [id number]  [phone]          Current Discharge Medication List          _______________________________   Attestations: This note is prepared by Mir Crawford , acting as a Scribe for Macarena Mcgovern PA-C  on 11:50 PM on 10/9/2017 . Macarena Mcgovern PA-C : The scribe's documentation has been prepared under my direction and personally reviewed by me in its entirety. I was personally available for consultation in the emergency department. I have reviewed the chart prior to the patient's discharge and agree with the documentation recorded by the [facility], including the assessment, treatment plan, and disposition. Late addendum:  I heard from the nursing staff and PA that she was coming to the ED with the same CHRONIC complaints that she regularly saw her PCP and a visit the same day as the ED visit to her endocrinologist.  It became quickly apparent that she had no urgent or emergent issues.   This was relayed to the patient but the ED staff agreed to check her annual lab work for her more convenience and patient satisfaction to r/o unlikely causes of worsening chronic complaints. She then up and left without word.   Jamey Arias MD  .    _______________________________

## 2018-03-01 ENCOUNTER — HOSPITAL ENCOUNTER (EMERGENCY)
Age: 38
Discharge: HOME | End: 2018-03-02
Attending: EMERGENCY MEDICINE | Admitting: EMERGENCY MEDICINE
Payer: MEDICAID

## 2018-03-01 DIAGNOSIS — R60.1 GENERALIZED EDEMA: Primary | ICD-10-CM

## 2018-03-01 PROCEDURE — 99283 EMERGENCY DEPT VISIT LOW MDM: CPT

## 2018-03-01 PROCEDURE — 81003 URINALYSIS AUTO W/O SCOPE: CPT | Performed by: EMERGENCY MEDICINE

## 2018-03-02 VITALS
SYSTOLIC BLOOD PRESSURE: 121 MMHG | RESPIRATION RATE: 18 BRPM | WEIGHT: 293 LBS | BODY MASS INDEX: 45.99 KG/M2 | HEART RATE: 112 BPM | DIASTOLIC BLOOD PRESSURE: 61 MMHG | OXYGEN SATURATION: 98 % | HEIGHT: 67 IN | TEMPERATURE: 98.2 F

## 2018-03-02 LAB
ANION GAP SERPL CALC-SCNC: 8 MMOL/L (ref 3–18)
APPEARANCE UR: CLEAR
BASOPHILS # BLD: 0 K/UL (ref 0–0.06)
BASOPHILS NFR BLD: 0 % (ref 0–2)
BILIRUB UR QL: NEGATIVE
BNP SERPL-MCNC: 24 PG/ML (ref 0–450)
BUN SERPL-MCNC: 12 MG/DL (ref 7–18)
BUN/CREAT SERPL: 14 (ref 12–20)
CALCIUM SERPL-MCNC: 8.9 MG/DL (ref 8.5–10.1)
CHLORIDE SERPL-SCNC: 102 MMOL/L (ref 100–108)
CK MB CFR SERPL CALC: NORMAL % (ref 0–4)
CK MB SERPL-MCNC: <1 NG/ML (ref 5–25)
CK SERPL-CCNC: 71 U/L (ref 26–192)
CO2 SERPL-SCNC: 28 MMOL/L (ref 21–32)
COLOR UR: YELLOW
CREAT SERPL-MCNC: 0.87 MG/DL (ref 0.6–1.3)
DIFFERENTIAL METHOD BLD: ABNORMAL
EOSINOPHIL # BLD: 0.3 K/UL (ref 0–0.4)
EOSINOPHIL NFR BLD: 2 % (ref 0–5)
ERYTHROCYTE [DISTWIDTH] IN BLOOD BY AUTOMATED COUNT: 15 % (ref 11.6–14.5)
GLUCOSE SERPL-MCNC: 206 MG/DL (ref 74–99)
GLUCOSE UR STRIP.AUTO-MCNC: NEGATIVE MG/DL
HCT VFR BLD AUTO: 34.5 % (ref 35–45)
HGB BLD-MCNC: 11.3 G/DL (ref 12–16)
HGB UR QL STRIP: NEGATIVE
KETONES UR QL STRIP.AUTO: ABNORMAL MG/DL
LEUKOCYTE ESTERASE UR QL STRIP.AUTO: NEGATIVE
LYMPHOCYTES # BLD: 2.2 K/UL (ref 0.9–3.6)
LYMPHOCYTES NFR BLD: 18 % (ref 21–52)
MCH RBC QN AUTO: 27.6 PG (ref 24–34)
MCHC RBC AUTO-ENTMCNC: 32.8 G/DL (ref 31–37)
MCV RBC AUTO: 84.4 FL (ref 74–97)
MONOCYTES # BLD: 0.6 K/UL (ref 0.05–1.2)
MONOCYTES NFR BLD: 5 % (ref 3–10)
NEUTS SEG # BLD: 9.2 K/UL (ref 1.8–8)
NEUTS SEG NFR BLD: 75 % (ref 40–73)
NITRITE UR QL STRIP.AUTO: NEGATIVE
PH UR STRIP: 5 [PH] (ref 5–8)
PLATELET # BLD AUTO: 318 K/UL (ref 135–420)
PMV BLD AUTO: 9 FL (ref 9.2–11.8)
POTASSIUM SERPL-SCNC: 3.6 MMOL/L (ref 3.5–5.5)
PROT UR STRIP-MCNC: NEGATIVE MG/DL
RBC # BLD AUTO: 4.09 M/UL (ref 4.2–5.3)
SODIUM SERPL-SCNC: 138 MMOL/L (ref 136–145)
SP GR UR REFRACTOMETRY: 1.03 (ref 1–1.03)
TROPONIN I SERPL-MCNC: <0.02 NG/ML (ref 0–0.06)
UROBILINOGEN UR QL STRIP.AUTO: 1 EU/DL (ref 0.2–1)
WBC # BLD AUTO: 12.3 K/UL (ref 4.6–13.2)

## 2018-03-02 PROCEDURE — 82550 ASSAY OF CK (CPK): CPT | Performed by: EMERGENCY MEDICINE

## 2018-03-02 PROCEDURE — 85025 COMPLETE CBC W/AUTO DIFF WBC: CPT | Performed by: EMERGENCY MEDICINE

## 2018-03-02 PROCEDURE — 83880 ASSAY OF NATRIURETIC PEPTIDE: CPT | Performed by: EMERGENCY MEDICINE

## 2018-03-02 PROCEDURE — 80048 BASIC METABOLIC PNL TOTAL CA: CPT | Performed by: EMERGENCY MEDICINE

## 2018-03-02 RX ORDER — HYDROCHLOROTHIAZIDE 25 MG/1
25 TABLET ORAL DAILY
Qty: 10 TAB | Refills: 0 | Status: SHIPPED | OUTPATIENT
Start: 2018-03-02 | End: 2018-03-02

## 2018-03-02 RX ORDER — HYDROCHLOROTHIAZIDE 25 MG/1
25 TABLET ORAL DAILY
Qty: 10 TAB | Refills: 0 | Status: SHIPPED | OUTPATIENT
Start: 2018-03-02 | End: 2018-03-15

## 2018-03-02 NOTE — ED PROVIDER NOTES
EMERGENCY DEPARTMENT HISTORY AND PHYSICAL EXAM    Date: 3/1/2018  Patient Name: Valerio Mccracken    History of Presenting Illness     Chief Complaint   Patient presents with    Ankle swelling         History Provided By: Patient    Chief Complaint: Swelling in bilateral arms, legs, and feet  Duration: 4 days   Timing:  Constant  Location: Bilateral arms, legs and feet  Quality: Tightness  Severity: 10 out of 10  Associated Symptoms: denies any other associated signs or symptoms    Additional History (Context):   11:29 PM  Valerio Mccracken is a 30 y.o. female with PMHx DM, HTN, and high cholesterol who presents ambulatory to the emergency department C/O swelling in bilateral arms, legs, and feet (rated 10/10), onset 4 days ago. Notes swelling is not changed with time of day. Notes no other associated sxs. She reports that swelling is new to her and that her skin feels hot. Pt checks her sugars x 4 daily. Pt attempted to set up a PCP appointment but her PCP was gone for the week. Pt denies taking water pill mixed with her HTN medication. Denies any h/o kidney or cardiac issues. Pt denies cough, fever, chills, body aches, dysuria, malodorous urine, tobacco/EtOH/illicit drug use, birth control use or any other sxs or complaints. PCP: Fabienne Redmond MD    Current Outpatient Prescriptions   Medication Sig Dispense Refill    hydroCHLOROthiazide (HYDRODIURIL) 25 mg tablet Take 1 Tab by mouth daily for 20 days. 10 Tab 0    INSULIN GLARGINE,HUM. REC. ANLOG (BASAGLAR KWIKPEN SC) 20 Units by SubCUTAneous route daily.  ascorbic acid, vitamin C, (VITAMIN C) 250 mg tablet Take  by mouth.  cholecalciferol (VITAMIN D3) 1,000 unit cap Take  by mouth daily.  calcium polycarbophil (FIBER LAXATIVE, CA POLYCARBO,) 625 mg tablet Take 625 mg by mouth daily.  pantoprazole (PROTONIX) 20 mg tablet Take 1 Tab by mouth two (2) times a day.  180 Tab 1    famotidine (PEPCID) 20 mg tablet Take 20 mg by mouth two (2) times a day.      losartan (COZAAR) 25 mg tablet Take 1 Tab by mouth daily. 30 Tab 0    glipiZIDE (GLUCOTROL) 10 mg tablet Take 10 mg by mouth two (2) times a day. Past History     Past Medical History:  Past Medical History:   Diagnosis Date    Diabetes ([address]     Gastroparesis     GERD (gastroesophageal reflux disease)     High cholesterol     Pancreatitis        Past Surgical History:  Past Surgical History:   Procedure Laterality Date    HX APPENDECTOMY      HX CHOLECYSTECTOMY         Family History:  History reviewed. No pertinent family history. Social History:  Social History   Substance Use Topics    Smoking status: Never Smoker    Smokeless tobacco: Never Used    Alcohol use No       Allergies: Allergies   Allergen Reactions    Effexor [Venlafaxine] Rash         Review of Systems   Review of Systems   Constitutional: Negative for chills, diaphoresis, fever and unexpected weight change. HENT: Negative for congestion, drooling, ear pain, rhinorrhea, sore throat, tinnitus and trouble swallowing. Eyes: Negative for photophobia, pain, redness and visual disturbance. Respiratory: Negative for cough, choking, chest tightness, shortness of breath, wheezing and stridor. Cardiovascular: Positive for leg swelling (bilateral). Negative for chest pain and palpitations. Gastrointestinal: Negative for abdominal distention, abdominal pain, anal bleeding, blood in stool, constipation, diarrhea, nausea and vomiting. Genitourinary: Negative for difficulty urinating, dysuria, flank pain, frequency, hematuria and urgency. Musculoskeletal: Positive for joint swelling (bilateral ankle). Negative for arthralgias, back pain and neck pain. (+) bilateral arm swelling   Skin: Negative for color change, rash and wound. Neurological: Negative for dizziness, seizures, syncope, speech difficulty, light-headedness and headaches. Hematological: Does not bruise/bleed easily. Psychiatric/Behavioral: Negative for agitation, behavioral problems, hallucinations, self-injury and suicidal ideas. The patient is not hyperactive. Physical Exam     Vitals:    03/01/18 2309 03/02/18 0024   BP:  121/61   Pulse: (!) 112    Resp: 18    Temp: 98.2 °F (36.8 °C)    SpO2: 98%    Weight: 136.1 kg (300 lb)    Height: 5' 7\" (1.702 m)      Physical Exam   Constitutional: She is oriented to person, place, and time. She appears well-developed and well-nourished. Non-toxic appearance. No distress. Overweight, well appearing, nontoxic   HENT:   Head: Normocephalic and atraumatic. Right Ear: External ear normal.   Left Ear: External ear normal.   Mouth/Throat: Oropharynx is clear and moist. No oropharyngeal exudate. Eyes: Conjunctivae and EOM are normal. Pupils are equal, round, and reactive to light. No scleral icterus. No pallor   Neck: Normal range of motion. Neck supple. No JVD present. No tracheal deviation present. No thyromegaly present. Cardiovascular: Normal rate, regular rhythm and normal heart sounds. Pulmonary/Chest: Effort normal and breath sounds normal. No stridor. No respiratory distress. Abdominal: Soft. Bowel sounds are normal. She exhibits no distension. There is no tenderness. There is no rebound and no guarding. Musculoskeletal: Normal range of motion. She exhibits no edema or tenderness. No soft tissue injuries. Generalized edema. Lymphadenopathy:     She has no cervical adenopathy. Neurological: She is alert and oriented to person, place, and time. She has normal reflexes. No cranial nerve deficit. Coordination normal.   Skin: Skin is warm and dry. No rash noted. She is not diaphoretic. No erythema. Psychiatric: She has a normal mood and affect. Her behavior is normal. Judgment and thought content normal.   Nursing note and vitals reviewed.     Diagnostic Study Results     Labs -     Recent Results (from the past 12 hour(s))   URINALYSIS W/ RFLX MICROSCOPIC Collection Time: 03/01/18 11:45 PM   Result Value Ref Range    Color YELLOW      Appearance CLEAR      Specific gravity 1.030 1.005 - 1.030      pH (UA) 5.0 5.0 - 8.0      Protein NEGATIVE  NEG mg/dL    Glucose NEGATIVE  NEG mg/dL    Ketone TRACE (A) NEG mg/dL    Bilirubin NEGATIVE  NEG      Blood NEGATIVE  NEG      Urobilinogen 1.0 0.2 - 1.0 EU/dL    Nitrites NEGATIVE  NEG      Leukocyte Esterase NEGATIVE  NEG     METABOLIC PANEL, BASIC    Collection Time: 03/02/18 12:03 AM   Result Value Ref Range    Sodium 138 136 - 145 mmol/L    Potassium 3.6 3.5 - 5.5 mmol/L    Chloride 102 100 - 108 mmol/L    CO2 28 21 - 32 mmol/L    Anion gap 8 3.0 - 18 mmol/L    Glucose 206 (H) 74 - 99 mg/dL    BUN 12 7.0 - 18 MG/DL    Creatinine 0.87 0.6 - 1.3 MG/DL    BUN/Creatinine ratio 14 12 - 20      GFR est AA >60 >60 ml/min/1.73m2    GFR est non-AA >60 >60 ml/min/1.73m2    Calcium 8.9 8.5 - 10.1 MG/DL   CBC WITH AUTOMATED DIFF    Collection Time: 03/02/18 12:03 AM   Result Value Ref Range    WBC 12.3 4.6 - 13.2 K/uL    RBC 4.09 (L) 4.20 - 5.30 M/uL    HGB 11.3 (L) 12.0 - 16.0 g/dL    HCT 34.5 (L) 35.0 - 45.0 %    MCV 84.4 74.0 - 97.0 FL    MCH 27.6 24.0 - 34.0 PG    MCHC 32.8 31.0 - 37.0 g/dL    RDW 15.0 (H) 11.6 - 14.5 %    PLATELET 143 251 - 250 K/uL    MPV 9.0 (L) 9.2 - 11.8 FL    NEUTROPHILS 75 (H) 40 - 73 %    LYMPHOCYTES 18 (L) 21 - 52 %    MONOCYTES 5 3 - 10 %    EOSINOPHILS 2 0 - 5 %    BASOPHILS 0 0 - 2 %    ABS. NEUTROPHILS 9.2 (H) 1.8 - 8.0 K/UL    ABS. LYMPHOCYTES 2.2 0.9 - 3.6 K/UL    ABS. MONOCYTES 0.6 0.05 - 1.2 K/UL    ABS. EOSINOPHILS 0.3 0.0 - 0.4 K/UL    ABS.  BASOPHILS 0.0 0.0 - 0.06 K/UL    DF AUTOMATED     CARDIAC PANEL,(CK, CKMB & TROPONIN)    Collection Time: 03/02/18 12:03 AM   Result Value Ref Range    CK 71 26 - 192 U/L    CK - MB <1.0 <3.6 ng/ml    CK-MB Index  0.0 - 4.0 %     CALCULATION NOT PERFORMED WHEN RESULT IS BELOW LINEAR LIMIT    Troponin-I, Qt. <0.02 0.00 - 0.06 NG/ML   NT-PRO BNP    Collection Time: 03/02/18 12:03 AM   Result Value Ref Range    NT pro-BNP 24 0 - 450 PG/ML       Radiologic Studies -    No orders to display     CT Results  (Last 48 hours)    None        CXR Results  (Last 48 hours)    None            Medical Decision Making   I am the first provider for this patient. I reviewed the vital signs, available nursing notes, past medical history, past surgical history, family history and social history. Vital Signs-Reviewed the patient's vital signs. Pulse Oximetry Analysis - 98% on RA     Records Reviewed: Nursing Notes    Provider Notes (Medical Decision Making):   Ddx: CHF, kidney disease including nephrotic or nephritic syndrome, electrolyte disturbance, malnutrition, or generalized edema    Procedures:  Procedures    ED Course:   11:29 PM   Initial assessment performed. The patients presenting problems have been discussed, and they are in agreement with the care plan formulated and outlined with them. I have encouraged them to ask questions as they arise throughout their visit. Diagnosis and Disposition       DISCHARGE NOTE:  1:13 AM  Selina Flowers's  results have been reviewed with her. She has been counseled regarding her diagnosis, treatment, and plan. She verbally conveys understanding and agreement of the signs, symptoms, diagnosis, treatment and prognosis and additionally agrees to follow up as discussed. She also agrees with the care-plan and conveys that all of her questions have been answered. I have also provided discharge instructions for her that include: educational information regarding their diagnosis and treatment, and list of reasons why they would want to return to the ED prior to their follow-up appointment, should her condition change. She has been provided with education for proper emergency department utilization. CLINICAL IMPRESSION:    1. Generalized edema        PLAN:  1. D/C Home  2.    Discharge Medication List as of 3/2/2018  1:12 AM START taking these medications    Details   hydroCHLOROthiazide (HYDRODIURIL) 25 mg tablet Take 1 Tab by mouth daily for 20 days. , Print, Disp-10 Tab, R-0         CONTINUE these medications which have NOT CHANGED    Details   INSULIN GLARGINE,HUM. REC. ANLOG (BASAGLAR KWIKPEN SC) 20 Units by SubCUTAneous route daily. , Historical Med      ascorbic acid, vitamin C, (VITAMIN C) 250 mg tablet Take  by mouth., Historical Med      cholecalciferol (VITAMIN D3) 1,000 unit cap Take  by mouth daily. , Historical Med      calcium polycarbophil (FIBER LAXATIVE, CA POLYCARBO,) 625 mg tablet Take 625 mg by mouth daily. , Historical Med      pantoprazole (PROTONIX) 20 mg tablet Take 1 Tab by mouth two (2) times a day., No Print, Disp-180 Tab, R-1      famotidine (PEPCID) 20 mg tablet Take 20 mg by mouth two (2) times a day., Historical Med      losartan (COZAAR) 25 mg tablet Take 1 Tab by mouth daily. , Print, Disp-30 Tab, R-0      glipiZIDE (GLUCOTROL) 10 mg tablet Take 10 mg by mouth two (2) times a day., Historical Med         STOP taking these medications       omeprazole (PRILOSEC) 10 mg capsule Comments:   Reason for Stopping:             3.   Follow-up Information     Follow up With Details Comments Contact Info    Stas Garcia MD Schedule an appointment as soon as possible for a visit in 2 days for PCP follow up [id number] [facility] [address] EMERGENCY DEPT  As needed, If symptoms worsen [address] [place] [address]  [phone]        _______________________________    Attestations: This note is prepared by Sonia Neri, acting as Scribe for Farrukh. Irene Harris MD.    Farrukh. Irene Harris MD:  The scribe's documentation has been prepared under my direction and personally reviewed by me in its entirety.   I confirm that the note above accurately reflects all work, treatment, procedures, and medical decision making performed by me.  _______________________________

## 2018-03-02 NOTE — ED TRIAGE NOTES
Patient states 4 days of swelling to both hands, both feet, and both legs, states they feel hot too, all of them.

## 2018-03-02 NOTE — DISCHARGE INSTRUCTIONS
Leg and Ankle Edema: Care Instructions  Your Care Instructions  Swelling in the legs, ankles, and feet is called edema. It is common after you sit or stand for a while. Long plane flights or car rides often cause swelling in the legs and feet. You may also have swelling if you have to stand for long periods of time at your job. Problems with the veins in the legs (varicose veins) and changes in hormones can also cause swelling. Sometimes the swelling in the ankles and feet is caused by a more serious problem, such as heart failure, infection, blood clots, or liver or kidney disease. Follow-up care is a key part of your treatment and safety. Be sure to make and go to all appointments, and call your doctor if you are having problems. It's also a good idea to know your test results and keep a list of the medicines you take. How can you care for yourself at home? · If your doctor gave you medicine, take it as prescribed. Call your doctor if you think you are having a problem with your medicine. · Whenever you are resting, raise your legs up. Try to keep the swollen area higher than the level of your heart. · Take breaks from standing or sitting in one position. ¨ Walk around to increase the blood flow in your lower legs. ¨ Move your feet and ankles often while you stand, or tighten and relax your leg muscles. · Wear support stockings. Put them on in the morning, before swelling gets worse. · Eat a balanced diet. Lose weight if you need to. · Limit the amount of salt (sodium) in your diet. Salt holds fluid in the body and may increase swelling. When should you call for help? Call 911 anytime you think you may need emergency care. For example, call if:  ? · You have symptoms of a blood clot in your lung (called a pulmonary embolism). These may include:  ¨ Sudden chest pain. ¨ Trouble breathing. ¨ Coughing up blood.    ?Call your doctor now or seek immediate medical care if:  ? · You have signs of a blood clot, such as:  ¨ Pain in your calf, back of the knee, thigh, or groin. ¨ Redness and swelling in your leg or groin. ? · You have symptoms of infection, such as:  ¨ Increased pain, swelling, warmth, or redness. ¨ Red streaks or pus. ¨ A fever. ? Watch closely for changes in your health, and be sure to contact your doctor if:  ? · Your swelling is getting worse. ? · You have new or worsening pain in your legs. ? · You do not get better as expected. Where can you learn more? Go to http://bambi-carmelina.info/. Enter B021 in the search box to learn more about \"Leg and Ankle Edema: Care Instructions. \"  Current as of: March 20, 2017  Content Version: 11.4  © 8921-5904 [employer]. Care instructions adapted under license by [employer] (which disclaims liability or warranty for this information). If you have questions about a medical condition or this instruction, always ask your healthcare professional. [place] [address] any warranty or liability for your use of this information.

## 2018-03-15 ENCOUNTER — HOSPITAL ENCOUNTER (EMERGENCY)
Age: 38
Discharge: HOME | End: 2018-03-15
Attending: EMERGENCY MEDICINE | Admitting: EMERGENCY MEDICINE
Payer: MEDICAID

## 2018-03-15 VITALS
WEIGHT: 293 LBS | BODY MASS INDEX: 45.99 KG/M2 | HEIGHT: 67 IN | OXYGEN SATURATION: 100 % | DIASTOLIC BLOOD PRESSURE: 83 MMHG | HEART RATE: 85 BPM | TEMPERATURE: 98.5 F | RESPIRATION RATE: 18 BRPM | SYSTOLIC BLOOD PRESSURE: 135 MMHG

## 2018-03-15 DIAGNOSIS — R10.33 PERIUMBILICAL PAIN: Primary | ICD-10-CM

## 2018-03-15 DIAGNOSIS — E11.42 DIABETIC POLYNEUROPATHY ASSOCIATED WITH TYPE 2 DIABETES MELLITUS (HCC): ICD-10-CM

## 2018-03-15 DIAGNOSIS — M79.2 NEURALGIA: ICD-10-CM

## 2018-03-15 PROCEDURE — 74011250637 HC RX REV CODE- 250/637: Performed by: EMERGENCY MEDICINE

## 2018-03-15 PROCEDURE — 96372 THER/PROPH/DIAG INJ SC/IM: CPT

## 2018-03-15 PROCEDURE — 99283 EMERGENCY DEPT VISIT LOW MDM: CPT

## 2018-03-15 PROCEDURE — 74011250636 HC RX REV CODE- 250/636: Performed by: EMERGENCY MEDICINE

## 2018-03-15 RX ORDER — LOSARTAN POTASSIUM AND HYDROCHLOROTHIAZIDE 12.5; 5 MG/1; MG/1
1 TABLET ORAL DAILY
COMMUNITY

## 2018-03-15 RX ORDER — CARBAMAZEPINE 200 MG/1
200 TABLET ORAL 3 TIMES DAILY
Qty: 30 TAB | Refills: 0 | Status: SHIPPED | OUTPATIENT
Start: 2018-03-15 | End: 2018-03-25

## 2018-03-15 RX ORDER — DICYCLOMINE HYDROCHLORIDE 20 MG/1
20 TABLET ORAL EVERY 6 HOURS
COMMUNITY
End: 2018-07-02

## 2018-03-15 RX ORDER — KETOROLAC TROMETHAMINE 30 MG/ML
60 INJECTION, SOLUTION INTRAMUSCULAR; INTRAVENOUS
Status: COMPLETED | OUTPATIENT
Start: 2018-03-15 | End: 2018-03-15

## 2018-03-15 RX ORDER — CARBAMAZEPINE 200 MG/1
200 TABLET ORAL
Status: COMPLETED | OUTPATIENT
Start: 2018-03-15 | End: 2018-03-15

## 2018-03-15 RX ADMIN — CARBAMAZEPINE 200 MG: 200 TABLET ORAL at 03:24

## 2018-03-15 RX ADMIN — KETOROLAC TROMETHAMINE 60 MG: 30 INJECTION, SOLUTION INTRAMUSCULAR at 03:19

## 2018-03-15 NOTE — ED NOTES
Pt saying to have 7/10 pain relief. Pt being d/c home. D/C instructions reviewed with pt/understanding acknowledged by pt. Pt's spouse states to be driving pt home.

## 2018-03-15 NOTE — ED PROVIDER NOTES
EMERGENCY DEPARTMENT HISTORY AND PHYSICAL EXAM    Date: 3/15/2018  Patient Name: Roberta Mahan    History of Presenting Illness     Chief Complaint   Patient presents with    Abdominal Pain         History Provided By: Patient    Chief Complaint: Pain to umbilical hernia repair site  Duration: 3 days   Timing:  Constant  Location: Abdomen  Quality: Burning  Severity: 10 out of 10  Modifying Factors: No relief with Motrin, Tylenol, or ASA  Associated Symptoms: denies any other associated signs or symptoms    Additional History (Context):   2:59 AM  Roberta Mahan is a 30 y.o. female with PMHx of GERD, pancreatitis, DM, and gastroparesis and PSHx of hernia repair in 2007) of  who presents ambulatory to the emergency department C/O constant, burning pain to umbilical hernia repair site (rated 10/10), onset 3 days ago. No relief with Motrin, Tylenol, or ASA. Notes no other associated sxs. Pt reports that her sugars have been running in 200's. Denies any h/o DKA. Pt denies N/V/D, urinary sxs, pelvic pain, tobacco/EtOH/illicit drug use, or any other sxs or complaints. PCP: Janelle Orlando MD    Current Outpatient Prescriptions   Medication Sig Dispense Refill    losartan-hydroCHLOROthiazide (HYZAAR) 50-12.5 mg per tablet Take 1 Tab by mouth daily.  dicyclomine (BENTYL) 20 mg tablet Take 20 mg by mouth every six (6) hours.  carBAMazepine (TEGRETOL) 200 mg tablet Take 1 Tab by mouth three (3) times daily for 10 days. 30 Tab 0    INSULIN GLARGINE,HUM. REC. ANLOG (BASAGLAR KWIKPEN SC) 20 Units by SubCUTAneous route daily.  ascorbic acid, vitamin C, (VITAMIN C) 250 mg tablet Take  by mouth.  cholecalciferol (VITAMIN D3) 1,000 unit cap Take  by mouth daily.  calcium polycarbophil (FIBER LAXATIVE, CA POLYCARBO,) 625 mg tablet Take 625 mg by mouth daily.  pantoprazole (PROTONIX) 20 mg tablet Take 1 Tab by mouth two (2) times a day.  180 Tab 1    famotidine (PEPCID) 20 mg tablet Take 20 mg by mouth two (2) times a day.  glipiZIDE (GLUCOTROL) 10 mg tablet Take 10 mg by mouth two (2) times a day. Past History     Past Medical History:  Past Medical History:   Diagnosis Date    Diabetes ([address]     Gastroparesis     GERD (gastroesophageal reflux disease)     Pancreatitis        Past Surgical History:  Past Surgical History:   Procedure Laterality Date    HX APPENDECTOMY      HX CHOLECYSTECTOMY         Family History:  History reviewed. No pertinent family history. Social History:  Social History   Substance Use Topics    Smoking status: Never Smoker    Smokeless tobacco: Never Used    Alcohol use No       Allergies: Allergies   Allergen Reactions    Effexor [Venlafaxine] Rash         Review of Systems   Review of Systems   Constitutional: Negative for chills, diaphoresis, fever and unexpected weight change. HENT: Negative for congestion, drooling, ear pain, rhinorrhea, sore throat, tinnitus and trouble swallowing. Eyes: Negative for photophobia, pain, redness and visual disturbance. Respiratory: Negative for cough, choking, chest tightness, shortness of breath, wheezing and stridor. Cardiovascular: Negative for chest pain, palpitations and leg swelling. Gastrointestinal: Positive for abdominal pain (to umbilical repair site). Negative for abdominal distention, anal bleeding, blood in stool, constipation, diarrhea, nausea and vomiting. Genitourinary: Negative for difficulty urinating, dysuria, flank pain, frequency, hematuria and urgency. Musculoskeletal: Negative for arthralgias, back pain and neck pain. Skin: Negative for color change, rash and wound. Neurological: Negative for dizziness, seizures, syncope, speech difficulty, light-headedness and headaches. Hematological: Does not bruise/bleed easily. Psychiatric/Behavioral: Negative for agitation, behavioral problems, hallucinations, self-injury and suicidal ideas. The patient is not hyperactive. Physical Exam     Vitals:    03/15/18 0053 03/15/18 0239 03/15/18 0351   BP: 151/90 (!) 138/99 135/83   Pulse: 87 81 85   Resp: 16 16 18   Temp: 98.3 °F (36.8 °C) 98 °F (36.7 °C) 98.5 °F (36.9 °C)   SpO2: 100% 100% 100%   Weight: 136.1 kg (300 lb)     Height: 5' 7\" (1.702 m)       Physical Exam   Constitutional: She is oriented to person, place, and time. She appears well-developed. Non-toxic appearance. No distress. Morbidly obese, comfortable appearing, nontoxic   HENT:   Head: Normocephalic and atraumatic. Right Ear: External ear normal.   Left Ear: External ear normal.   Mouth/Throat: Oropharynx is clear and moist. No oropharyngeal exudate. Eyes: Conjunctivae and EOM are normal. Pupils are equal, round, and reactive to light. No scleral icterus. No pallor   Neck: Normal range of motion. Neck supple. No JVD present. No tracheal deviation present. No thyromegaly present. Cardiovascular: Normal rate, regular rhythm and normal heart sounds. Pulmonary/Chest: Effort normal and breath sounds normal. No stridor. No respiratory distress. Abdominal: Soft. Bowel sounds are normal. She exhibits no distension. There is tenderness (minimal) in the periumbilical area. There is no rebound and no guarding. Musculoskeletal: Normal range of motion. She exhibits no edema or tenderness. No soft tissue injuries   Lymphadenopathy:     She has no cervical adenopathy. Neurological: She is alert and oriented to person, place, and time. She has normal reflexes. No cranial nerve deficit. Coordination normal.   Skin: Skin is warm and dry. No rash noted. She is not diaphoretic. No erythema. Psychiatric: She has a normal mood and affect. Her behavior is normal. Judgment and thought content normal.   Nursing note and vitals reviewed. Diagnostic Study Results     Labs -   No results found for this or any previous visit (from the past 12 hour(s)).     Radiologic Studies -    No orders to display     CT Results (Last 48 hours)    None        CXR Results  (Last 48 hours)    None          MEDICATIONS GIVEN:  Medications   ketorolac tromethamine (TORADOL) 60 mg/2 mL injection 60 mg (60 mg IntraMUSCular Given 3/15/18 0319)   carBAMazepine (TEGretol) tablet 200 mg (200 mg Oral Given 3/15/18 3464)       Medical Decision Making   I am the first provider for this patient. I reviewed the vital signs, available nursing notes, past medical history, past surgical history, family history and social history. Vital Signs-Reviewed the patient's vital signs. Pulse Oximetry Analysis - 100% on RA     Records Reviewed: Nursing Notes    Provider Notes (Medical Decision Making):   Ddx: Possible but unlikely infection or abscess to some of the deep tissue, no erythema, more likely neuralgia or sxs associated with ongoing diabetic neuropathy or gastroparesis or uterine prolapse. Procedures:  Procedures    ED Course:   2:59 AM   Initial assessment performed. The patients presenting problems have been discussed, and they are in agreement with the care plan formulated and outlined with them. I have encouraged them to ask questions as they arise throughout their visit. Diagnosis and Disposition       DISCHARGE NOTE:  3:46 AM  Selina Flowers's  results have been reviewed with her. She has been counseled regarding her diagnosis, treatment, and plan. She verbally conveys understanding and agreement of the signs, symptoms, diagnosis, treatment and prognosis and additionally agrees to follow up as discussed. She also agrees with the care-plan and conveys that all of her questions have been answered. I have also provided discharge instructions for her that include: educational information regarding their diagnosis and treatment, and list of reasons why they would want to return to the ED prior to their follow-up appointment, should her condition change.  She has been provided with education for proper emergency department utilization. CLINICAL IMPRESSION:    1. Periumbilical pain    2. Neuralgia    3. Diabetic polyneuropathy associated with type 2 diabetes mellitus ([facility] [address]        PLAN:  1. D/C Home  2. Discharge Medication List as of 3/15/2018  3:44 AM      START taking these medications    Details   carBAMazepine (TEGRETOL) 200 mg tablet Take 1 Tab by mouth three (3) times daily for 10 days. , Normal, Disp-30 Tab, R-0         CONTINUE these medications which have NOT CHANGED    Details   losartan-hydroCHLOROthiazide (HYZAAR) 50-12.5 mg per tablet Take 1 Tab by mouth daily. , Historical Med      dicyclomine (BENTYL) 20 mg tablet Take 20 mg by mouth every six (6) hours. , Historical Med      INSULIN GLARGINE,HUM. REC. ANLOG (BASAGLAR KWIKPEN SC) 20 Units by SubCUTAneous route daily. , Historical Med      ascorbic acid, vitamin C, (VITAMIN C) 250 mg tablet Take  by mouth., Historical Med      cholecalciferol (VITAMIN D3) 1,000 unit cap Take  by mouth daily. , Historical Med      calcium polycarbophil (FIBER LAXATIVE, CA POLYCARBO,) 625 mg tablet Take 625 mg by mouth daily. , Historical Med      pantoprazole (PROTONIX) 20 mg tablet Take 1 Tab by mouth two (2) times a day., No Print, Disp-180 Tab, R-1      famotidine (PEPCID) 20 mg tablet Take 20 mg by mouth two (2) times a day., Historical Med      glipiZIDE (GLUCOTROL) 10 mg tablet Take 10 mg by mouth two (2) times a day., Historical Med           3. Follow-up Information     Follow up With Details Comments Contact Info    Mariangel Doan MD Schedule an appointment as soon as possible for a visit in 2 days for [place] up [id number] [address] [place] [address]      [facility] EMERGENCY DEPT  As needed, If symptoms worsen [address] [address]  [phone]        _______________________________    Attestations: This note is prepared by Lezlie Apley, acting as Scribe for Fidelina Cortez MD.    SunMikael.  Veda Cortez MD:  The scribe's documentation has been prepared under my direction and personally reviewed by me in its entirety.   I confirm that the note above accurately reflects all work, treatment, procedures, and medical decision making performed by me.  _______________________________

## 2018-03-15 NOTE — ED NOTES
Pt sitting on the stretcher. Pt rating the pain/discomfort of the umbilical area 85/27. No grimacing/or guarding observed. Able to sit on the stretcher comfortably. MD to review her visits outside the ED- multiple visits.

## 2018-03-15 NOTE — ED NOTES
Toradol IM adm to aide pt's pain/discomfort, 10/10. Have notified pharmacy to request the TEGretol PO- will adm once receive from pharmacy.

## 2018-03-15 NOTE — ED TRIAGE NOTES
Pain and burning to umbilical hernia repair site. Surgery was years ago. Pt reports pain began on Monday 3/12. Please review care everywhere for recent visits for multiple complaints to other facilities in area. Sepsis Screening completed    (  )Patient meets SIRS criteria. ( xxx )Patient does not meet SIRS criteria.       SIRS Criteria is achieved when two or more of the following are present   Temperature < 96.8°F (36°C) or > 100.9°F (38.3°C)   Heart Rate > 90 beats per minute   Respiratory Rate > 20 breaths per minute   WBC count > 12,000 or <4,000 or > 10% bands

## 2018-03-15 NOTE — DISCHARGE INSTRUCTIONS
Abdominal Pain: Care Instructions  Your Care Instructions    Abdominal pain has many possible causes. Some aren't serious and get better on their own in a few days. Others need more testing and treatment. If your pain continues or gets worse, you need to be rechecked and may need more tests to find out what is wrong. You may need surgery to correct the problem. Don't ignore new symptoms, such as fever, nausea and vomiting, urination problems, pain that gets worse, and dizziness. These may be signs of a more serious problem. Your doctor may have recommended a follow-up visit in the next 8 to 12 hours. If you are not getting better, you may need more tests or treatment. The doctor has checked you carefully, but problems can develop later. If you notice any problems or new symptoms, get medical treatment right away. Follow-up care is a key part of your treatment and safety. Be sure to make and go to all appointments, and call your doctor if you are having problems. It's also a good idea to know your test results and keep a list of the medicines you take. How can you care for yourself at home? · Rest until you feel better. · To prevent dehydration, drink plenty of fluids, enough so that your urine is light yellow or clear like water. Choose water and other caffeine-free clear liquids until you feel better. If you have kidney, heart, or liver disease and have to limit fluids, talk with your doctor before you increase the amount of fluids you drink. · If your stomach is upset, eat mild foods, such as rice, dry toast or crackers, bananas, and applesauce. Try eating several small meals instead of two or three large ones. · Wait until 48 hours after all symptoms have gone away before you have spicy foods, alcohol, and drinks that contain caffeine. · Do not eat foods that are high in fat. · Avoid anti-inflammatory medicines such as aspirin, ibuprofen (Advil, Motrin), and naproxen (Aleve).  These can cause stomach upset. Talk to your doctor if you take daily aspirin for another health problem. When should you call for help? Call 911 anytime you think you may need emergency care. For example, call if:  ? · You passed out (lost consciousness). ? · You pass maroon or very bloody stools. ? · You vomit blood or what looks like coffee grounds. ? · You have new, severe belly pain. ?Call your doctor now or seek immediate medical care if:  ? · Your pain gets worse, especially if it becomes focused in one area of your belly. ? · You have a new or higher fever. ? · Your stools are black and look like tar, or they have streaks of blood. ? · You have unexpected vaginal bleeding. ? · You have symptoms of a urinary tract infection. These may include:  ¨ Pain when you urinate. ¨ Urinating more often than usual.  ¨ Blood in your urine. ? · You are dizzy or lightheaded, or you feel like you may faint. ? Watch closely for changes in your health, and be sure to contact your doctor if:  ? · You are not getting better after 1 day (24 hours). Where can you learn more? Go to http://bambi-carmelina.info/. Enter H291 in the search box to learn more about \"Abdominal Pain: Care Instructions. \"  Current as of: March 20, 2017  Content Version: 11.4  © 5831-9769 [employer]. Care instructions adapted under license by [employer] (which disclaims liability or warranty for this information). If you have questions about a medical condition or this instruction, always ask your healthcare professional. [place] [address] any warranty or liability for your use of this information.

## 2018-03-15 NOTE — ED NOTES
Assumed care of pt from triage. Pt saying that she has burning at old umbilical site from hernia repair in 2007. Skin intact, no redness of area. Pt denies NV.

## 2018-06-18 ENCOUNTER — HOSPITAL ENCOUNTER (EMERGENCY)
Age: 38
Discharge: HOME | End: 2018-06-18
Attending: EMERGENCY MEDICINE
Payer: MEDICAID

## 2018-06-18 VITALS
RESPIRATION RATE: 18 BRPM | DIASTOLIC BLOOD PRESSURE: 73 MMHG | TEMPERATURE: 97.9 F | SYSTOLIC BLOOD PRESSURE: 147 MMHG | HEIGHT: 67 IN | BODY MASS INDEX: 45.99 KG/M2 | OXYGEN SATURATION: 98 % | WEIGHT: 293 LBS | HEART RATE: 86 BPM

## 2018-06-18 DIAGNOSIS — R10.13 ABDOMINAL PAIN, EPIGASTRIC: ICD-10-CM

## 2018-06-18 DIAGNOSIS — N39.0 URINARY TRACT INFECTION WITHOUT HEMATURIA, SITE UNSPECIFIED: Primary | ICD-10-CM

## 2018-06-18 LAB
ALBUMIN SERPL-MCNC: 3.4 G/DL (ref 3.4–5)
ALBUMIN/GLOB SERPL: 0.8 {RATIO} (ref 0.8–1.7)
ALP SERPL-CCNC: 73 U/L (ref 45–117)
ALT SERPL-CCNC: 32 U/L (ref 13–56)
ANION GAP SERPL CALC-SCNC: 10 MMOL/L (ref 3–18)
APPEARANCE UR: ABNORMAL
AST SERPL-CCNC: 15 U/L (ref 15–37)
BACTERIA URNS QL MICRO: ABNORMAL /HPF
BASOPHILS # BLD: 0 K/UL (ref 0–0.06)
BASOPHILS NFR BLD: 0 % (ref 0–2)
BILIRUB SERPL-MCNC: 0.3 MG/DL (ref 0.2–1)
BILIRUB UR QL: NEGATIVE
BUN SERPL-MCNC: 8 MG/DL (ref 7–18)
BUN/CREAT SERPL: 10 (ref 12–20)
CALCIUM SERPL-MCNC: 9.7 MG/DL (ref 8.5–10.1)
CHLORIDE SERPL-SCNC: 102 MMOL/L (ref 100–108)
CO2 SERPL-SCNC: 28 MMOL/L (ref 21–32)
COLOR UR: YELLOW
CREAT SERPL-MCNC: 0.81 MG/DL (ref 0.6–1.3)
DIFFERENTIAL METHOD BLD: ABNORMAL
EOSINOPHIL # BLD: 0.2 K/UL (ref 0–0.4)
EOSINOPHIL NFR BLD: 2 % (ref 0–5)
EPITH CASTS URNS QL MICRO: ABNORMAL /LPF (ref 0–5)
ERYTHROCYTE [DISTWIDTH] IN BLOOD BY AUTOMATED COUNT: 14.3 % (ref 11.6–14.5)
GLOBULIN SER CALC-MCNC: 4.4 G/DL (ref 2–4)
GLUCOSE SERPL-MCNC: 142 MG/DL (ref 74–99)
GLUCOSE UR STRIP.AUTO-MCNC: NEGATIVE MG/DL
HCT VFR BLD AUTO: 35.8 % (ref 35–45)
HGB BLD-MCNC: 11.7 G/DL (ref 12–16)
HGB UR QL STRIP: NEGATIVE
KETONES UR QL STRIP.AUTO: NEGATIVE MG/DL
LEUKOCYTE ESTERASE UR QL STRIP.AUTO: ABNORMAL
LYMPHOCYTES # BLD: 1.9 K/UL (ref 0.9–3.6)
LYMPHOCYTES NFR BLD: 20 % (ref 21–52)
MCH RBC QN AUTO: 27 PG (ref 24–34)
MCHC RBC AUTO-ENTMCNC: 32.7 G/DL (ref 31–37)
MCV RBC AUTO: 82.7 FL (ref 74–97)
MONOCYTES # BLD: 0.3 K/UL (ref 0.05–1.2)
MONOCYTES NFR BLD: 3 % (ref 3–10)
NEUTS SEG # BLD: 7.3 K/UL (ref 1.8–8)
NEUTS SEG NFR BLD: 75 % (ref 40–73)
NITRITE UR QL STRIP.AUTO: NEGATIVE
PH UR STRIP: 7 [PH] (ref 5–8)
PLATELET # BLD AUTO: 327 K/UL (ref 135–420)
PMV BLD AUTO: 9 FL (ref 9.2–11.8)
POTASSIUM SERPL-SCNC: 3.3 MMOL/L (ref 3.5–5.5)
PROT SERPL-MCNC: 7.8 G/DL (ref 6.4–8.2)
PROT UR STRIP-MCNC: NEGATIVE MG/DL
RBC # BLD AUTO: 4.33 M/UL (ref 4.2–5.3)
RBC #/AREA URNS HPF: ABNORMAL /HPF (ref 0–5)
SODIUM SERPL-SCNC: 140 MMOL/L (ref 136–145)
SP GR UR REFRACTOMETRY: 1.02 (ref 1–1.03)
UROBILINOGEN UR QL STRIP.AUTO: 1 EU/DL (ref 0.2–1)
WBC # BLD AUTO: 9.7 K/UL (ref 4.6–13.2)
WBC URNS QL MICRO: ABNORMAL /HPF (ref 0–5)

## 2018-06-18 PROCEDURE — 80053 COMPREHEN METABOLIC PANEL: CPT | Performed by: PHYSICIAN ASSISTANT

## 2018-06-18 PROCEDURE — 81001 URINALYSIS AUTO W/SCOPE: CPT | Performed by: PHYSICIAN ASSISTANT

## 2018-06-18 PROCEDURE — 99283 EMERGENCY DEPT VISIT LOW MDM: CPT

## 2018-06-18 PROCEDURE — 85025 COMPLETE CBC W/AUTO DIFF WBC: CPT | Performed by: PHYSICIAN ASSISTANT

## 2018-06-18 RX ORDER — NITROFURANTOIN 25; 75 MG/1; MG/1
100 CAPSULE ORAL 2 TIMES DAILY
Qty: 14 CAP | Refills: 0 | Status: SHIPPED | OUTPATIENT
Start: 2018-06-18 | End: 2018-06-25

## 2018-06-18 RX ORDER — LANSOPRAZOLE 30 MG/1
30 CAPSULE, DELAYED RELEASE ORAL
COMMUNITY
End: 2018-07-02

## 2018-06-18 NOTE — ED NOTES
Went in room to draw blood on patient. Pt states provider told her \"there was nothing he could do\". Provider notified that patient has questions prior to lab draw.

## 2018-06-18 NOTE — DISCHARGE INSTRUCTIONS
Abdominal Pain: Care Instructions  Your Care Instructions    Abdominal pain has many possible causes. Some aren't serious and get better on their own in a few days. Others need more testing and treatment. If your pain continues or gets worse, you need to be rechecked and may need more tests to find out what is wrong. You may need surgery to correct the problem. Don't ignore new symptoms, such as fever, nausea and vomiting, urination problems, pain that gets worse, and dizziness. These may be signs of a more serious problem. Your doctor may have recommended a follow-up visit in the next 8 to 12 hours. If you are not getting better, you may need more tests or treatment. The doctor has checked you carefully, but problems can develop later. If you notice any problems or new symptoms, get medical treatment right away. Follow-up care is a key part of your treatment and safety. Be sure to make and go to all appointments, and call your doctor if you are having problems. It's also a good idea to know your test results and keep a list of the medicines you take. How can you care for yourself at home? · Rest until you feel better. · To prevent dehydration, drink plenty of fluids, enough so that your urine is light yellow or clear like water. Choose water and other caffeine-free clear liquids until you feel better. If you have kidney, heart, or liver disease and have to limit fluids, talk with your doctor before you increase the amount of fluids you drink. · If your stomach is upset, eat mild foods, such as rice, dry toast or crackers, bananas, and applesauce. Try eating several small meals instead of two or three large ones. · Wait until 48 hours after all symptoms have gone away before you have spicy foods, alcohol, and drinks that contain caffeine. · Do not eat foods that are high in fat. · Avoid anti-inflammatory medicines such as aspirin, ibuprofen (Advil, Motrin), and naproxen (Aleve).  These can cause stomach upset. Talk to your doctor if you take daily aspirin for another health problem. When should you call for help? Call 911 anytime you think you may need emergency care. For example, call if:  ? · You passed out (lost consciousness). ? · You pass maroon or very bloody stools. ? · You vomit blood or what looks like coffee grounds. ? · You have new, severe belly pain. ?Call your doctor now or seek immediate medical care if:  ? · Your pain gets worse, especially if it becomes focused in one area of your belly. ? · You have a new or higher fever. ? · Your stools are black and look like tar, or they have streaks of blood. ? · You have unexpected vaginal bleeding. ? · You have symptoms of a urinary tract infection. These may include:  ¨ Pain when you urinate. ¨ Urinating more often than usual.  ¨ Blood in your urine. ? · You are dizzy or lightheaded, or you feel like you may faint. ? Watch closely for changes in your health, and be sure to contact your doctor if:  ? · You are not getting better after 1 day (24 hours). Where can you learn more? Go to http://bambiAirspan Networkscarmelina.info/. Enter E642 in the search box to learn more about \"Abdominal Pain: Care Instructions. \"  Current as of: March 20, 2017  Content Version: 11.4  © 2625-4272 [employer]. Care instructions adapted under license by [employer] (which disclaims liability or warranty for this information). If you have questions about a medical condition or this instruction, always ask your healthcare professional. [place] [address] any warranty or liability for your use of this information. Urinary Tract Infection in Women: Care Instructions  Your Care Instructions    A urinary tract infection, or UTI, is a general term for an infection anywhere between the kidneys and the urethra (where urine comes out). Most UTIs are bladder infections.  They often cause pain or burning when you urinate. UTIs are caused by bacteria and can be cured with antibiotics. Be sure to complete your treatment so that the infection goes away. Follow-up care is a key part of your treatment and safety. Be sure to make and go to all appointments, and call your doctor if you are having problems. It's also a good idea to know your test results and keep a list of the medicines you take. How can you care for yourself at home? · Take your antibiotics as directed. Do not stop taking them just because you feel better. You need to take the full course of antibiotics. · Drink extra water and other fluids for the next day or two. This may help wash out the bacteria that are causing the infection. (If you have kidney, heart, or liver disease and have to limit fluids, talk with your doctor before you increase your fluid intake.)  · Avoid drinks that are carbonated or have caffeine. They can irritate the bladder. · Urinate often. Try to empty your bladder each time. · To relieve pain, take a hot bath or lay a heating pad set on low over your lower belly or genital area. Never go to sleep with a heating pad in place. To prevent UTIs  · Drink plenty of water each day. This helps you urinate often, which clears bacteria from your system. (If you have kidney, heart, or liver disease and have to limit fluids, talk with your doctor before you increase your fluid intake.)  · Urinate when you need to. · Urinate right after you have sex. · Change sanitary pads often. · Avoid douches, bubble baths, feminine hygiene sprays, and other feminine hygiene products that have deodorants. · After going to the bathroom, wipe from front to back. When should you call for help? Call your doctor now or seek immediate medical care if:  ? · Symptoms such as fever, chills, nausea, or vomiting get worse or appear for the first time. ? · You have new pain in your back just below your rib cage. This is called flank pain.    ? · There is new blood or pus in your urine. ? · You have any problems with your antibiotic medicine. ? Watch closely for changes in your health, and be sure to contact your doctor if:  ? · You are not getting better after taking an antibiotic for 2 days. ? · Your symptoms go away but then come back. Where can you learn more? Go to http://bambi-carmelina.info/. Enter S431 in the search box to learn more about \"Urinary Tract Infection in Women: Care Instructions. \"  Current as of: May 12, 2017  Content Version: 11.4  © 5104-3726 [employer]. Care instructions adapted under license by [employer] (which disclaims liability or warranty for this information). If you have questions about a medical condition or this instruction, always ask your healthcare professional. [address] any warranty or liability for your use of this information.

## 2018-06-18 NOTE — ED PROVIDER NOTES
[address]  EMERGENCY DEPARTMENT HISTORY AND PHYSICAL EXAM    Date: 6/18/2018  Patient Name: Kike Koo  YOB: 1980  Medical Record Number: [id number]     History of Presenting Illness     Chief Complaint   Patient presents with    Abdominal Pain       History Provided By: Patient    Chief Complaint: Abdominal pain  Duration: 1 Months  Timing:  Constant and Worsening  Location: Upper abdomen  Quality: Burning  Severity: 10 out of 10  Modifying Factors: Pain is worse with any eating, drinking, and laying down. No relieving factors. Associated Symptoms: Nausea    Additional History (Context):   12:52 PM  Kike Koo is a 30 y.o. female with PMHX GERD, DM, pancreatitis, & gastroparesis, who presents to the emergency department C/O 10/10 upper abdominal pain x 1 month. Pain is worse with any eating, drinking, and laying down. No relieving factors. Associated symptoms include nausea. Reports she was rx 800 mg Ibuprofen BID s/p hysterectomy on 5/7/18, pt developed this pain 1 week after starting this medication. Pt followed by GI who she saw last week, was dx with questionable stomach ulcer, and was scheduled for EDG in 3 weeks on 8/8/18. However, pt states pain is worsening. She has spoken to her GI doctor about the worsening pain, but was not rx'd anything per pt. Other PSHx of tomás and appy. Pt denies vomiting, dysuria, urinary frequency, urinary urgency, hematuria, urine decreased, difficulty urinating, constipation, diarrhea, fever, chills, and any other Sx or complaints. Shx: -tobacco use, -EtOH use, -illicit drug use    PCP: Liset Davis MD  Specialist: GI    Current Outpatient Prescriptions   Medication Sig Dispense Refill    lansoprazole (PREVACID) 30 mg capsule Take 30 mg by mouth Daily (before breakfast).  nitrofurantoin, macrocrystal-monohydrate, (MACROBID) 100 mg capsule Take 1 Cap by mouth two (2) times a day for 7 days.  14 Cap 0    losartan-hydroCHLOROthiazide (HYZAAR) 50-12.5 mg per tablet Take 1 Tab by mouth daily.  dicyclomine (BENTYL) 20 mg tablet Take 20 mg by mouth every six (6) hours.  ascorbic acid, vitamin C, (VITAMIN C) 250 mg tablet Take  by mouth.  cholecalciferol (VITAMIN D3) 1,000 unit cap Take  by mouth daily.  calcium polycarbophil (FIBER LAXATIVE, CA POLYCARBO,) 625 mg tablet Take 625 mg by mouth daily.  pantoprazole (PROTONIX) 20 mg tablet Take 1 Tab by mouth two (2) times a day. 180 Tab 1    famotidine (PEPCID) 20 mg tablet Take 20 mg by mouth two (2) times a day.  glipiZIDE (GLUCOTROL) 10 mg tablet Take 10 mg by mouth two (2) times a day. Past History     Past Medical History:  Past Medical History:   Diagnosis Date    Diabetes ([facility] [address]     Gastroparesis     GERD (gastroesophageal reflux disease)     Pancreatitis        Past Surgical History:  Past Surgical History:   Procedure Laterality Date    HX APPENDECTOMY      HX CHOLECYSTECTOMY      HX HYSTERECTOMY         Family History:  No family history on file. Social History:  Social History   Substance Use Topics    Smoking status: Never Smoker    Smokeless tobacco: Never Used    Alcohol use No       Allergies: Allergies   Allergen Reactions    Effexor [Venlafaxine] Rash         Review of Systems     Review of Systems   Constitutional:  Denies malaise, fever, chills. Head:  Denies injury. Face:  Denies injury or pain. ENMT:  Denies sore throat. Neck:  Denies injury or pain. Chest:  Denies injury. Cardiac:  Denies chest pain or palpitations. Respiratory:  Denies cough, wheezing, difficulty breathing, shortness of breath. GI/ABD:  +Abdomen pain, nausea. Denies injury, distention, vomiting, diarrhea, constipation. :  Denies injury, pain, dysuria, urinary frequency, urinary urgency, hematuria, urine decreased, difficulty urinating. Back:  Denies injury or pain. Pelvis:  Denies injury or pain. Extremity/MS:  Denies injury or pain. Neuro:  Denies headache, LOC, dizziness, neurologic symptoms/deficits/paresthesias. Skin: Denies injury, rash, itching or skin changes. Physical Exam     Vitals:    06/18/18 1240   BP: 147/73   Pulse: 86   Resp: 18   Temp: 97.9 °F (36.6 °C)   SpO2: 98%   Weight: 136.1 kg (300 lb)   Height: 5' 7\" (1.702 m)     Physical Exam   Nursing note and vitals reviewed. CONSTITUTIONAL: Alert, in no apparent distress; well-developed; well-nourished. HEAD:  Normocephalic, atraumatic. EYES: PERRL; EOM's intact. ENTM: Nose: No rhinorrhea; Throat: mucous membranes moist. Posterior pharynx-normal.  Neck:  No JVD, supple without lymphadenopathy. RESP: Chest clear, equal breath sounds. CV: S1 and S2 WNL; No murmurs, gallops or rubs. GI: Abdomen soft and mild epigastric tenderness, +BS, NG, NR. No masses or organomegaly. UPPER EXT:  Normal inspection. LOWER EXT: Normal inspection. NEURO: strength 5/5 and sym, sensation intact. SKIN: No rashes; Normal for age and stage. PSYCH:  Alert and oriented, normal affect. Diagnostic Study Results     Labs -     Recent Results (from the past 12 hour(s))   CBC WITH AUTOMATED DIFF    Collection Time: 06/18/18  1:26 PM   Result Value Ref Range    WBC 9.7 4.6 - 13.2 K/uL    RBC 4.33 4.20 - 5.30 M/uL    HGB 11.7 (L) 12.0 - 16.0 g/dL    HCT 35.8 35.0 - 45.0 %    MCV 82.7 74.0 - 97.0 FL    MCH 27.0 24.0 - 34.0 PG    MCHC 32.7 31.0 - 37.0 g/dL    RDW 14.3 11.6 - 14.5 %    PLATELET 037 501 - 294 K/uL    MPV 9.0 (L) 9.2 - 11.8 FL    NEUTROPHILS 75 (H) 40 - 73 %    LYMPHOCYTES 20 (L) 21 - 52 %    MONOCYTES 3 3 - 10 %    EOSINOPHILS 2 0 - 5 %    BASOPHILS 0 0 - 2 %    ABS. NEUTROPHILS 7.3 1.8 - 8.0 K/UL    ABS. LYMPHOCYTES 1.9 0.9 - 3.6 K/UL    ABS. MONOCYTES 0.3 0.05 - 1.2 K/UL    ABS. EOSINOPHILS 0.2 0.0 - 0.4 K/UL    ABS.  BASOPHILS 0.0 0.0 - 0.06 K/UL    DF AUTOMATED     METABOLIC PANEL, COMPREHENSIVE    Collection Time: 06/18/18 1:26 PM   Result Value Ref Range    Sodium 140 136 - 145 mmol/L    Potassium 3.3 (L) 3.5 - 5.5 mmol/L    Chloride 102 100 - 108 mmol/L    CO2 28 21 - 32 mmol/L    Anion gap 10 3.0 - 18 mmol/L    Glucose 142 (H) 74 - 99 mg/dL    BUN 8 7.0 - 18 MG/DL    Creatinine 0.81 0.6 - 1.3 MG/DL    BUN/Creatinine ratio 10 (L) 12 - 20      GFR est AA >60 >60 ml/min/1.73m2    GFR est non-AA >60 >60 ml/min/1.73m2    Calcium 9.7 8.5 - 10.1 MG/DL    Bilirubin, total 0.3 0.2 - 1.0 MG/DL    ALT (SGPT) 32 13 - 56 U/L    AST (SGOT) 15 15 - 37 U/L    Alk. phosphatase 73 45 - 117 U/L    Protein, total 7.8 6.4 - 8.2 g/dL    Albumin 3.4 3.4 - 5.0 g/dL    Globulin 4.4 (H) 2.0 - 4.0 g/dL    A-G Ratio 0.8 0.8 - 1.7     URINALYSIS W/ RFLX MICROSCOPIC    Collection Time: 06/18/18  1:44 PM   Result Value Ref Range    Color YELLOW      Appearance CLOUDY      Specific gravity 1.022 1.005 - 1.030      pH (UA) 7.0 5.0 - 8.0      Protein NEGATIVE  NEG mg/dL    Glucose NEGATIVE  NEG mg/dL    Ketone NEGATIVE  NEG mg/dL    Bilirubin NEGATIVE  NEG      Blood NEGATIVE  NEG      Urobilinogen 1.0 0.2 - 1.0 EU/dL    Nitrites NEGATIVE  NEG      Leukocyte Esterase SMALL (A) NEG     URINE MICROSCOPIC ONLY    Collection Time: 06/18/18  1:44 PM   Result Value Ref Range    WBC 1 to 4 0 - 5 /hpf    RBC NONE 0 - 5 /hpf    Epithelial cells 4+ 0 - 5 /lpf    Bacteria FEW (A) NEG /hpf       Radiologic Studies -   No orders to display     Medications Given in the ED:  Medications - No data to display     Medical Decision Making     I am the first provider for this patient. I reviewed the vital signs, available nursing notes, past medical history, past surgical history, family history and social history. Records Reviewed: Nursing Notes, Old Medical Records, Previous Radiology Studies and Previous Laboratory Studies    Vital Signs-Reviewed the patient's vital signs.   Patient Vitals for the past 12 hrs:   Temp Pulse Resp BP SpO2   06/18/18 1240 97.9 °F (36.6 °C) 86 18 147/73 98 %       Pulse Oximetry Analysis - Normal 98% on RA        Provider Notes (Medical Decision Making):   DDx: gastroenteritis, GERD, hernia, hepatitis, pancreatitis, gallbladder etiology, constipation, adhesions, UTI, pyelo, kidney stones, STD,  Uooc-Efli-Exskqn syndrome, preg, ectopic, ovarian cyst, ovarian torsion, tubo-ovarian abscess, appendicitis, diverticulitis, SBO, GI bleed, mesenteric ischemia, AAA, cardiac etiology, musculoskeletal pain/spasm, malignancy  IMPRESSION AND MEDICAL DECISION MAKING:  Based upon the patient's presentation with noted HPI and PE, along with the work up done in the emergency department, I believe that the patient has a UTI as well as chronic abdominal pain. Labs are reassuring. Will treat her UTI and have her follow up with her GI provider as planned or sooner. Procedures:  Procedures     ED Course:   12:52 PM Initial assessment performed. Pt and/or pt's family are aware of the plan of care and are in agreement. Diagnosis and Disposition       Discharge Note:  2:36 PM  Selina Flowers's  results have been reviewed with her. She has been counseled regarding her diagnosis, treatment, and plan. She verbally conveys understanding and agreement of the signs, symptoms, diagnosis, treatment and prognosis and additionally agrees to follow up as discussed. She also agrees with the care-plan and conveys that all of her questions have been answered. I have also provided discharge instructions for her that include: educational information regarding their diagnosis and treatment, and list of reasons why they would want to return to the ED prior to their follow-up appointment, should her condition change. She has been provided with education for proper emergency department utilization. Clinical Impression:    1. Urinary tract infection without hematuria, site unspecified    2. Abdominal pain, epigastric        PLAN:  1. D/C Home  2.    Current Discharge Medication List START taking these medications    Details   nitrofurantoin, macrocrystal-monohydrate, (MACROBID) 100 mg capsule Take 1 Cap by mouth two (2) times a day for 7 days. Qty: 14 Cap, Refills: 0           3. Follow-up Information     Follow up With Details Comments [address] [facility], [place] Schedule an appointment as soon as possible for a visit For follow up with your GI specialist [id number] [facility] [address] [phone]      [facility] EMERGENCY DEPT Go to As needed, if symptoms worsen [address] [address]  [phone]        _______________________________    Attestations: This note is prepared by Martha Angeles, acting as Scribe for Netta Dupree PA-C. Netta Dupree PA-C:  The scribe's documentation has been prepared under my direction and personally reviewed by me in its entirety.   I confirm that the note above accurately reflects all work, treatment, procedures, and medical decision making performed by me.  _______________________________

## 2018-06-18 NOTE — ED TRIAGE NOTES
Triage: pt complains of abdominal pain since May 15. Pt states that she had a hysterectomy on May 7 and was prescribed 800mg Motrin. Pt started taking it 2x daily and then developed pain. Pt states that she saw her GI doc last week and she was told that it may be an ulcer. Pt scheduled for an endoscopy August 8.

## 2018-07-02 ENCOUNTER — HOSPITAL ENCOUNTER (EMERGENCY)
Age: 38
Discharge: HOME | End: 2018-07-02
Attending: EMERGENCY MEDICINE
Payer: MEDICAID

## 2018-07-02 ENCOUNTER — APPOINTMENT (OUTPATIENT)
Dept: GENERAL RADIOLOGY | Age: 38
End: 2018-07-02
Attending: EMERGENCY MEDICINE
Payer: MEDICAID

## 2018-07-02 VITALS
HEIGHT: 67 IN | SYSTOLIC BLOOD PRESSURE: 142 MMHG | OXYGEN SATURATION: 100 % | WEIGHT: 293 LBS | TEMPERATURE: 97.5 F | RESPIRATION RATE: 12 BRPM | DIASTOLIC BLOOD PRESSURE: 79 MMHG | HEART RATE: 88 BPM | BODY MASS INDEX: 45.99 KG/M2

## 2018-07-02 DIAGNOSIS — Z86.79 H/O: HTN (HYPERTENSION): ICD-10-CM

## 2018-07-02 DIAGNOSIS — M51.9 LUMBAR DISC DISEASE: ICD-10-CM

## 2018-07-02 DIAGNOSIS — Z86.39 H/O DIABETES MELLITUS: ICD-10-CM

## 2018-07-02 DIAGNOSIS — M50.90 CERVICAL DISC DISEASE: Primary | ICD-10-CM

## 2018-07-02 PROCEDURE — 72052 X-RAY EXAM NECK SPINE 6/>VWS: CPT

## 2018-07-02 PROCEDURE — 72110 X-RAY EXAM L-2 SPINE 4/>VWS: CPT

## 2018-07-02 PROCEDURE — 99283 EMERGENCY DEPT VISIT LOW MDM: CPT

## 2018-07-02 PROCEDURE — 74011250637 HC RX REV CODE- 250/637: Performed by: EMERGENCY MEDICINE

## 2018-07-02 RX ORDER — LANOLIN ALCOHOL/MO/W.PET/CERES
CREAM (GRAM) TOPICAL
COMMUNITY

## 2018-07-02 RX ORDER — HYDROCODONE BITARTRATE AND ACETAMINOPHEN 5; 325 MG/1; MG/1
TABLET ORAL
Qty: 12 TAB | Refills: 0 | Status: SHIPPED | OUTPATIENT
Start: 2018-07-02 | End: 2018-12-13

## 2018-07-02 RX ORDER — OXYCODONE AND ACETAMINOPHEN 5; 325 MG/1; MG/1
2 TABLET ORAL
Status: COMPLETED | OUTPATIENT
Start: 2018-07-02 | End: 2018-07-02

## 2018-07-02 RX ORDER — METHOCARBAMOL 500 MG/1
1000 TABLET, FILM COATED ORAL 3 TIMES DAILY
Qty: 30 TAB | Refills: 0 | Status: SHIPPED | OUTPATIENT
Start: 2018-07-02 | End: 2018-12-13

## 2018-07-02 RX ADMIN — OXYCODONE HYDROCHLORIDE AND ACETAMINOPHEN 2 TABLET: 5; 325 TABLET ORAL at 06:47

## 2018-07-02 NOTE — ED NOTES
shift change report given to Alvin Rico (oncoming nurse) by Bernardo Dejesus RN (offgoing nurse). Report included the following information ED Summary and MAR.

## 2018-07-02 NOTE — ED PROVIDER NOTES
EMERGENCY DEPARTMENT HISTORY AND PHYSICAL EXAM    Date: 7/2/2018  Patient Name: Karen Rojas    History of Presenting Illness     Chief Complaint   Patient presents with    Back Pain         History Provided By: Patient    Chief Complaint: Lower back pain  Duration: 5 days   Timing:  Constant  Location: Lower back  Quality: Aching  Severity: 10 out of 10  Associated Symptoms: neck pain    Additional History (Context):   6:33 AM  Karen Rojas is a 30 y.o. female with PMHx of GERD and DM who presents to the emergency department C/O constant, aching, lower back pain (rated 10/10), onset 5 days ago. Associated sxs include neck pain. Back and neck pain occurred simultaneously. Notes no FHX of RA, lupus, or sciatic arthritis. Pt denies arm numbness or tingling, urinary sxs, bowel sxs, tobacco/EtOH/illicit drug use, or any other sxs or complaints. PCP: Flaquito Gray MD    Current Outpatient Prescriptions   Medication Sig Dispense Refill    ferrous sulfate (IRON) 325 mg (65 mg iron) tablet Take  by mouth Daily (before breakfast).  methocarbamol (ROBAXIN) 500 mg tablet Take 2 Tabs by mouth three (3) times daily. 30 Tab 0    HYDROcodone-acetaminophen (NORCO) 5-325 mg per tablet Take 1-2 tablets PO every 4-6 hours as needed for pain control. If over the counter ibuprofen or acetaminophen was suggested, then only take the vicodin for pain not well controlled with the over the counter medication. 12 Tab 0    losartan-hydroCHLOROthiazide (HYZAAR) 50-12.5 mg per tablet Take 1 Tab by mouth daily.  ascorbic acid, vitamin C, (VITAMIN C) 250 mg tablet Take  by mouth.  cholecalciferol (VITAMIN D3) 1,000 unit cap Take  by mouth daily.  pantoprazole (PROTONIX) 20 mg tablet Take 1 Tab by mouth two (2) times a day. 180 Tab 1    glipiZIDE (GLUCOTROL) 10 mg tablet Take 10 mg by mouth two (2) times a day.          Past History     Past Medical History:  Past Medical History:   Diagnosis Date    Anemia  Diabetes ([facility] [address]     Gastroparesis     GERD (gastroesophageal reflux disease)     HTN (hypertension)     Pancreatitis        Past Surgical History:  Past Surgical History:   Procedure Laterality Date    HX APPENDECTOMY      HX CHOLECYSTECTOMY      HX HYSTERECTOMY         Family History:  History reviewed. No pertinent family history. Social History:  Social History   Substance Use Topics    Smoking status: Never Smoker    Smokeless tobacco: Never Used    Alcohol use No       Allergies: Allergies   Allergen Reactions    Effexor [Venlafaxine] Rash         Review of Systems   Review of Systems   Constitutional: Negative for chills, diaphoresis, fever and unexpected weight change. HENT: Negative for congestion, drooling, ear pain, rhinorrhea, sore throat, tinnitus and trouble swallowing. Eyes: Negative for photophobia, pain, redness and visual disturbance. Respiratory: Negative for cough, choking, chest tightness, shortness of breath, wheezing and stridor. Cardiovascular: Negative for chest pain, palpitations and leg swelling. Gastrointestinal: Negative for abdominal distention, abdominal pain, anal bleeding, blood in stool, constipation, diarrhea, nausea and vomiting. Genitourinary: Negative for difficulty urinating, dysuria, flank pain, frequency, hematuria and urgency. Musculoskeletal: Positive for back pain and neck pain (posterior). Negative for arthralgias. Skin: Negative for color change, rash and wound. Neurological: Negative for dizziness, seizures, syncope, speech difficulty, light-headedness and headaches. Hematological: Does not bruise/bleed easily. Psychiatric/Behavioral: Negative for agitation, behavioral problems, hallucinations, self-injury and suicidal ideas. The patient is not hyperactive.         Physical Exam     Vitals:    07/02/18 0527   BP: 142/79   Pulse: 88   Resp: 12   Temp: 97.5 °F (36.4 °C)   SpO2: 100%   Weight: 134.7 kg (297 lb)   Height: 5' 7\" (1.702 m)     Physical Exam   Constitutional: She is oriented to person, place, and time. She appears well-developed and well-nourished. No distress. HENT:   Head: Normocephalic and atraumatic. Right Ear: External ear normal.   Left Ear: External ear normal.   Mouth/Throat: Oropharynx is clear and moist. No oropharyngeal exudate. Eyes: Conjunctivae and EOM are normal. Pupils are equal, round, and reactive to light. No scleral icterus. No pallor   Neck: Normal range of motion. Neck supple. No JVD present. Muscular tenderness present. No tracheal deviation present. No thyromegaly present. Tender at C7   Cardiovascular: Normal rate, regular rhythm and normal heart sounds. Pulmonary/Chest: Effort normal and breath sounds normal. No stridor. No respiratory distress. Abdominal: Soft. Bowel sounds are normal. She exhibits no distension. There is no tenderness. There is no rebound and no guarding. Musculoskeletal: Normal range of motion. She exhibits no edema. Lumbar back: She exhibits tenderness. No soft tissue injuries. Tender lumbar spine   Lymphadenopathy:     She has no cervical adenopathy. Neurological: She is alert and oriented to person, place, and time. She has normal reflexes. No cranial nerve deficit. Coordination normal.   Skin: Skin is warm and dry. No rash noted. She is not diaphoretic. No erythema. Psychiatric: She has a normal mood and affect. Her behavior is normal. Judgment and thought content normal.   Nursing note and vitals reviewed. Diagnostic Study Results     Labs -   No results found for this or any previous visit (from the past 12 hour(s)).     Radiologic Studies -    XR SPINE LUMB MIN 4 V    (Results Pending)   XR SPINE CERV 4 OR 5 V    (Results Pending)     7:16 AM  RADIOLOGY FINDINGS  L-spine X-ray shows mild disc disease to the lumbar spine with no compression, no mass  Pending review by Radiologist  Recorded by AMANDA Murdock, as dictated by Foreign Short Joe Jasso MD    7:16 AM  RADIOLOGY FINDINGS  C-spine X-ray shows mild to moderate arthritic changes with minor visible compression, nothing acute, no soft tissue swelling  Pending review by Radiologist  Recorded by Conner Morel ED Scribe, as dictated by Farrukh. Joe Jasso MD    CT Results  (Last 48 hours)    None        CXR Results  (Last 48 hours)    None            Medical Decision Making   I am the first provider for this patient. I reviewed the vital signs, available nursing notes, past medical history, past surgical history, family history and social history. Vital Signs-Reviewed the patient's vital signs. Pulse Oximetry Analysis - 100% on RA     Records Reviewed: Nursing Notes and Old Medical Records    Provider Notes (Medical Decision Making):   Ddx: Likely DJD but without clinical radiculopathy, no findings of vascular disease on exam. Unlikely to represent cardiovascular disease. ACS considered but very unlikely    Procedures:  Procedures    MEDICATIONS GIVEN:  Medications   oxyCODONE-acetaminophen (PERCOCET) 5-325 mg per tablet 2 Tab (2 Tabs Oral Given 7/2/18 1609)       ED Course:   6:33 AM   Initial assessment performed. The patients presenting problems have been discussed, and they are in agreement with the care plan formulated and outlined with them. I have encouraged them to ask questions as they arise throughout their visit. Diagnosis and Disposition     DISCHARGE NOTE:  7:17 AM  Selina Flowers's  results have been reviewed with her. She has been counseled regarding her diagnosis, treatment, and plan. She verbally conveys understanding and agreement of the signs, symptoms, diagnosis, treatment and prognosis and additionally agrees to follow up as discussed. She also agrees with the care-plan and conveys that all of her questions have been answered.   I have also provided discharge instructions for her that include: educational information regarding their diagnosis and treatment, and list of reasons why they would want to return to the ED prior to their follow-up appointment, should her condition change. She has been provided with education for proper emergency department utilization. CLINICAL IMPRESSION:    1. Cervical disc disease    2. Lumbar disc disease    3. H/O diabetes mellitus    4. H/O: HTN (hypertension)        PLAN:  1. D/C Home  2. Current Discharge Medication List      START taking these medications    Details   methocarbamol (ROBAXIN) 500 mg tablet Take 2 Tabs by mouth three (3) times daily. Qty: 30 Tab, Refills: 0    Associated Diagnoses: Cervical disc disease; Lumbar disc disease      HYDROcodone-acetaminophen (NORCO) 5-325 mg per tablet Take 1-2 tablets PO every 4-6 hours as needed for pain control. If over the counter ibuprofen or acetaminophen was suggested, then only take the vicodin for pain not well controlled with the over the counter medication. Qty: 12 Tab, Refills: 0    Associated Diagnoses: Cervical disc disease; Lumbar disc disease         CONTINUE these medications which have NOT CHANGED    Details   ferrous sulfate (IRON) 325 mg (65 mg iron) tablet Take  by mouth Daily (before breakfast). losartan-hydroCHLOROthiazide (HYZAAR) 50-12.5 mg per tablet Take 1 Tab by mouth daily. ascorbic acid, vitamin C, (VITAMIN C) 250 mg tablet Take  by mouth. cholecalciferol (VITAMIN D3) 1,000 unit cap Take  by mouth daily. pantoprazole (PROTONIX) 20 mg tablet Take 1 Tab by mouth two (2) times a day. Qty: 180 Tab, Refills: 1      glipiZIDE (GLUCOTROL) 10 mg tablet Take 10 mg by mouth two (2) times a day.            3.   Follow-up Information     Follow up With Details Comments Contact Info    Bernie Mccrary MD Schedule an appointment as soon as possible for a visit in 2 days For primary care follow up [id number] [employer] [place] [address]      [facility] EMERGENCY DEPT  As needed, If symptoms worsen [address] [address]  [phone]        _______________________________    Attestations: This note is prepared by Lauro Banda, acting as Scribe for Farrukh. Guy Constantino MD.    Farrukh. Guy Constantino MD:  The scribe's documentation has been prepared under my direction and personally reviewed by me in its entirety.   I confirm that the note above accurately reflects all work, treatment, procedures, and medical decision making performed by me.  _______________________________

## 2018-07-02 NOTE — DISCHARGE INSTRUCTIONS
Low Back Pain: Exercises  Your Care Instructions  Here are some examples of typical rehabilitation exercises for your condition. Start each exercise slowly. Ease off the exercise if you start to have pain. Your doctor or physical therapist will tell you when you can start these exercises and which ones will work best for you. How to do the exercises  Press-up    1. Lie on your stomach, supporting your body with your forearms. 2. Press your elbows down into the floor to raise your upper back. As you do this, relax your stomach muscles and allow your back to arch without using your back muscles. As your press up, do not let your hips or pelvis come off the floor. 3. Hold for 15 to 30 seconds, then relax. 4. Repeat 2 to 4 times. Alternate arm and leg (bird dog) exercise    Do this exercise slowly. Try to keep your body straight at all times, and do not let one hip drop lower than the other. 1. Start on the floor, on your hands and knees. 2. Tighten your belly muscles. 3. Raise one leg off the floor, and hold it straight out behind you. Be careful not to let your hip drop down, because that will twist your trunk. 4. Hold for about 6 seconds, then lower your leg and switch to the other leg. 5. Repeat 8 to 12 times on each leg. 6. Over time, work up to holding for 10 to 30 seconds each time. 7. If you feel stable and secure with your leg raised, try raising the opposite arm straight out in front of you at the same time. Knee-to-chest exercise    1. Lie on your back with your knees bent and your feet flat on the floor. 2. Bring one knee to your chest, keeping the other foot flat on the floor (or keeping the other leg straight, whichever feels better on your lower back). 3. Keep your lower back pressed to the floor. Hold for at least 15 to 30 seconds. 4. Relax, and lower the knee to the starting position. 5. Repeat with the other leg. Repeat 2 to 4 times with each leg.   6. To get more stretch, put your other leg flat on the floor while pulling your knee to your chest.  Curl-ups    1. Lie on the floor on your back with your knees bent at a 90-degree angle. Your feet should be flat on the floor, about 12 inches from your buttocks. 2. Cross your arms over your chest. If this bothers your neck, try putting your hands behind your neck (not your head), with your elbows spread apart. 3. Slowly tighten your belly muscles and raise your shoulder blades off the floor. 4. Keep your head in line with your body, and do not press your chin to your chest.  5. Hold this position for 1 or 2 seconds, then slowly lower yourself back down to the floor. 6. Repeat 8 to 12 times. Pelvic tilt exercise    1. Lie on your back with your knees bent. 2. \"Brace\" your stomach. This means to tighten your muscles by pulling in and imagining your belly button moving toward your spine. You should feel like your back is pressing to the floor and your hips and pelvis are rocking back. 3. Hold for about 6 seconds while you breathe smoothly. 4. Repeat 8 to 12 times. Heel dig bridging    1. Lie on your back with both knees bent and your ankles bent so that only your heels are digging into the floor. Your knees should be bent about 90 degrees. 2. Then push your heels into the floor, squeeze your buttocks, and lift your hips off the floor until your shoulders, hips, and knees are all in a straight line. 3. Hold for about 6 seconds as you continue to breathe normally, and then slowly lower your hips back down to the floor and rest for up to 10 seconds. 4. Do 8 to 12 repetitions. Hamstring stretch in doorway    1. Lie on your back in a doorway, with one leg through the open door. 2. Slide your leg up the wall to straighten your knee. You should feel a gentle stretch down the back of your leg. 3. Hold the stretch for at least 15 to 30 seconds. Do not arch your back, point your toes, or bend either knee.  Keep one heel touching the floor and the other heel touching the wall. 4. Repeat with your other leg. 5. Do 2 to 4 times for each leg. Hip flexor stretch    1. Kneel on the floor with one knee bent and one leg behind you. Place your forward knee over your foot. Keep your other knee touching the floor. 2. Slowly push your hips forward until you feel a stretch in the upper thigh of your rear leg. 3. Hold the stretch for at least 15 to 30 seconds. Repeat with your other leg. 4. Do 2 to 4 times on each side. Wall sit    1. Stand with your back 10 to 12 inches away from a wall. 2. Lean into the wall until your back is flat against it. 3. Slowly slide down until your knees are slightly bent, pressing your lower back into the wall. 4. Hold for about 6 seconds, then slide back up the wall. 5. Repeat 8 to 12 times. Follow-up care is a key part of your treatment and safety. Be sure to make and go to all appointments, and call your doctor if you are having problems. It's also a good idea to know your test results and keep a list of the medicines you take. Where can you learn more? Go to http://[employer].info/. Enter M024 in the search box to learn more about \"Low Back Pain: Exercises. \"  Current as of: March 21, 2017  Content Version: 11.4  © 4859-1749 [employer]. Care instructions adapted under license by [employer] (which disclaims liability or warranty for this information). If you have questions about a medical condition or this instruction, always ask your healthcare professional. [place] [address] any warranty or liability for your use of this information. Cervical Disc Disease: Care Instructions  Your Care Instructions    Cervical disc disease results from damage, disease, or wear and tear to the discs between the bones (vertebra) in your neck. The discs act as shock absorbers for the spine and keep the spine flexible.  When a disc is damaged, it can bulge out and press against the nerve roots or spinal cord. This is sometimes called a herniated or \"slipped disc. \" This pressure can cause pain and numbness or tingling in your arms and hands. It can also cause weakness in your legs. An accident can damage a disc and cause it to break open (rupture). Aging and hard physical work can also cause damage to cervical discs. The first treatments for cervical disc disease include physical therapy, special neck exercises, heat, and pain medicine. If these fail, your doctor may inject steroids and pain medicine into your neck. Surgery is usually done only if other treatments have not worked. Follow-up care is a key part of your treatment and safety. Be sure to make and go to all appointments, and call your doctor if you are having problems. It's also a good idea to know your test results and keep a list of the medicines you take. How can you care for yourself at home? · Take pain medicines exactly as directed. ¨ If the doctor gave you a prescription medicine for pain, take it as prescribed. ¨ If you are not taking a prescription pain medicine, ask your doctor if you can take an over-the-counter medicine. · Don't spend too long in one position. Take short breaks to move around and change positions. · Wear a seat belt and shoulder harness when you are in a car. · Sleep with a pillow under your head and neck that keeps your neck straight. · Follow your doctor's instructions for gentle neck-stretching exercises. · Do not smoke. Smoking can slow healing of your discs. If you need help quitting, talk to your doctor about stop-smoking programs and medicines. These can increase your chances of quitting for good. · Avoid strenuous work or exercise until your doctor says it is okay. When should you call for help? Call 911 anytime you think you may need emergency care. For example, call if:  ? · You are unable to move an arm or a leg at all.    ?Call your doctor now or seek immediate medical care if:  ? · You have new or worse symptoms in your arms, legs, belly, or buttocks. Symptoms may include:  ¨ Numbness or tingling. ¨ Weakness. ¨ Pain. ? · You lose bladder or bowel control. ? Watch closely for changes in your health, and be sure to contact your doctor if:  ? · You do not get better as expected. Where can you learn more? Go to http://bambi-carmelina.info/. Enter N118 in the search box to learn more about \"Cervical Disc Disease: Care Instructions. \"  Current as of: March 21, 2017  Content Version: 11.4  © 6006-4318 [employer]. Care instructions adapted under license by [employer] (which disclaims liability or warranty for this information). If you have questions about a medical condition or this instruction, always ask your healthcare professional. [address] any warranty or liability for your use of this information.

## 2018-12-13 ENCOUNTER — HOSPITAL ENCOUNTER (EMERGENCY)
Age: 38
Discharge: HOME | End: 2018-12-13
Attending: EMERGENCY MEDICINE
Payer: MEDICAID

## 2018-12-13 VITALS
HEART RATE: 107 BPM | SYSTOLIC BLOOD PRESSURE: 145 MMHG | WEIGHT: 293 LBS | BODY MASS INDEX: 45.99 KG/M2 | OXYGEN SATURATION: 100 % | RESPIRATION RATE: 18 BRPM | TEMPERATURE: 98.5 F | HEIGHT: 67 IN | DIASTOLIC BLOOD PRESSURE: 61 MMHG

## 2018-12-13 DIAGNOSIS — R35.0 URINARY FREQUENCY: Primary | ICD-10-CM

## 2018-12-13 LAB
APPEARANCE UR: CLEAR
BILIRUB UR QL: NEGATIVE
COLOR UR: YELLOW
GLUCOSE UR STRIP.AUTO-MCNC: NEGATIVE MG/DL
HGB UR QL STRIP: NEGATIVE
KETONES UR QL STRIP.AUTO: NEGATIVE MG/DL
LEUKOCYTE ESTERASE UR QL STRIP.AUTO: NEGATIVE
NITRITE UR QL STRIP.AUTO: NEGATIVE
PH UR STRIP: 7 [PH] (ref 5–8)
PROT UR STRIP-MCNC: NEGATIVE MG/DL
SP GR UR REFRACTOMETRY: 1.01 (ref 1–1.03)
UROBILINOGEN UR QL STRIP.AUTO: 1 EU/DL (ref 0.2–1)

## 2018-12-13 PROCEDURE — 81003 URINALYSIS AUTO W/O SCOPE: CPT

## 2018-12-13 PROCEDURE — 87086 URINE CULTURE/COLONY COUNT: CPT

## 2018-12-13 PROCEDURE — 99283 EMERGENCY DEPT VISIT LOW MDM: CPT

## 2018-12-13 NOTE — DISCHARGE INSTRUCTIONS
Frequent Urination: Care Instructions  Your Care Instructions  An urge to urinate frequently but usually passing only small amounts of urine is a common symptom of urinary problems, such as urinary tract infections. The bladder may become inflamed. This can cause the urge to urinate. You may try to urinate more often than usual to try to soothe that urge. Frequent urination also may be caused by sexually transmitted infections (STIs) or kidney stones. Or it may happen when something irritates the tube that carries urine from the bladder to the outside of the body (urethra). It may also be a sign of diabetes. The cause may be hard to find. You may need tests. Follow-up care is a key part of your treatment and safety. Be sure to make and go to all appointments, and call your doctor if you are having problems. It's also a good idea to know your test results and keep a list of the medicines you take. How can you care for yourself at home? · Drink extra water for the next day or two. This will help make the urine less concentrated. (If you have kidney, heart, or liver disease and have to limit fluids, talk with your doctor before you increase the amount of fluids you drink.)  · Avoid drinks that are carbonated or have caffeine. They can irritate the bladder. For women:  · Urinate right after you have sex. · After you go to the bathroom, wipe from front to back. · Avoid douches, bubble baths, and feminine hygiene sprays. And avoid other feminine hygiene products that have deodorants. When should you call for help? Call your doctor now or seek immediate medical care if:    · You have new symptoms, such as fever, nausea, or vomiting.     · You have new or worse symptoms of a urinary problem. For example:  ? You have blood or pus in your urine. ? You have chills or body aches. ? It hurts to urinate. ? You have groin or belly pain. ? You have pain in your back just below your rib cage (the flank area).  Watch closely for changes in your health, and be sure to contact your doctor if you feel thirstier than usual.  Where can you learn more? Go to http://bambi-carmelina.info/. Enter 409 1060 in the search box to learn more about \"Frequent Urination: Care Instructions. \"  Current as of: March 21, 2018  Content Version: 11.8  © 8796-6880 [employer]. Care instructions adapted under license by [employer] (which disclaims liability or warranty for this information). If you have questions about a medical condition or this instruction, always ask your healthcare professional. [place] [address] any warranty or liability for your use of this information.

## 2018-12-13 NOTE — ED PROVIDER NOTES
EMERGENCY DEPARTMENT HISTORY AND PHYSICAL EXAM    Date: 12/13/2018  Patient Name: Kilo Santizo    History of Presenting Illness     Chief Complaint   Patient presents with    Urinary Frequency         History Provided By: Patient    Chief Complaint: frequent urination  Duration: 1 Weeks  Timing:  Constant  Location: bladder  Severity: Moderate  Associated Symptoms: bilateral flank pain, nausea, fatigue, and weakness. Additional History (Context):   2:30 PM  Kilo Santizo is a  [address] y.o. female with PMHX of UTI who presents to the emergency department C/O frequent urination. Associated sxs include bilateral flank pain, nausea, fatigue, and weakness. Pt reports she was previously seen at [facility] and mara'd with cystitis. She was rx'd Bactrim in which she had an allergic rxn with hives. Her abx was not changes but she was referred here to the ED. Pt notes she may have a \"kidney infection\". She has a hx of UTIs but has not had these sxs before. PMHx includes GERD, pancreatitis, DM, gastroparesis, anemia and HTN. PSHx includes cholecystectomy, appendectomy, and hysterectomy. Pt denies dysuria, hematuria, vomiting, and any other sxs or complaints. PCP: Sabrina Lamb MD    Current Outpatient Medications   Medication Sig Dispense Refill    ferrous sulfate (IRON) 325 mg (65 mg iron) tablet Take  by mouth Daily (before breakfast).  losartan-hydroCHLOROthiazide (HYZAAR) 50-12.5 mg per tablet Take 1 Tab by mouth daily.  ascorbic acid, vitamin C, (VITAMIN C) 250 mg tablet Take  by mouth.  cholecalciferol (VITAMIN D3) 1,000 unit cap Take  by mouth daily.  pantoprazole (PROTONIX) 20 mg tablet Take 1 Tab by mouth two (2) times a day. 180 Tab 1    glipiZIDE (GLUCOTROL) 10 mg tablet Take 10 mg by mouth two (2) times a day.          Past History     Past Medical History:  Past Medical History:   Diagnosis Date    Anemia     Diabetes ([address]     Gastroparesis     GERD (gastroesophageal reflux disease)     HTN (hypertension)     Pancreatitis        Past Surgical History:  Past Surgical History:   Procedure Laterality Date    HX APPENDECTOMY      HX CHOLECYSTECTOMY      HX HYSTERECTOMY         Family History:  History reviewed. No pertinent family history. Social History:  Social History     Tobacco Use    Smoking status: Never Smoker    Smokeless tobacco: Never Used   Substance Use Topics    Alcohol use: No    Drug use: No       Allergies: Allergies   Allergen Reactions    Bactrim [Sulfamethoprim] Hives     Itching, hives    Effexor [Venlafaxine] Rash         Review of Systems   Review of Systems   Constitutional: Positive for fatigue. Negative for chills and fever. Gastrointestinal: Positive for nausea. Negative for vomiting. Genitourinary: Positive for flank pain (bilateral flank pain) and frequency. Negative for difficulty urinating, dysuria, hematuria, urgency, vaginal bleeding and vaginal discharge. Musculoskeletal: Positive for back pain. Neurological: Positive for weakness. Negative for headaches. All other systems reviewed and are negative. Physical Exam     Vitals:    12/13/18 1401   BP: 145/61   Pulse: (!) 107   Resp: 18   Temp: 98.5 °F (36.9 °C)   SpO2: 100%   Weight: 133.8 kg (295 lb)   Height: 5' 7\" (1.702 m)     Physical Exam   Constitutional: She is oriented to person, place, and time. She appears well-developed and well-nourished. No distress. Obese AA female in NAD. Alert. Appears comfortable. In fast track room   HENT:   Head: Normocephalic and atraumatic. Right Ear: External ear normal.   Left Ear: External ear normal.   Nose: Nose normal.   Eyes: Conjunctivae are normal.   Neck: Normal range of motion. Cardiovascular: Normal rate, regular rhythm, normal heart sounds and intact distal pulses. Exam reveals no gallop and no friction rub. No murmur heard. Pulmonary/Chest: Effort normal and breath sounds normal. No accessory muscle usage. No tachypnea. No respiratory distress. She has no decreased breath sounds. She has no wheezes. She has no rhonchi. She has no rales. Abdominal: Normal appearance. She exhibits no distension and no mass. There is no tenderness. There is no rigidity, no rebound, no guarding, no CVA tenderness and no tenderness at McBurney's point. Musculoskeletal: Normal range of motion. Neurological: She is alert and oriented to person, place, and time. Skin: Skin is warm and dry. No rash noted. She is not diaphoretic. No erythema. Psychiatric: She has a normal mood and affect. Judgment normal.   Nursing note and vitals reviewed. Diagnostic Study Results     Labs -     No results found for this or any previous visit (from the past 12 hour(s)). Radiologic Studies -   No orders to display     CT Results  (Last 48 hours)    None        CXR Results  (Last 48 hours)    None          Medications given in the ED-  Medications - No data to display      Medical Decision Making   I am the first provider for this patient. I reviewed the vital signs, available nursing notes, past medical history, past surgical history, family history and social history. Vital Signs-Reviewed the patient's vital signs. Pulse Oximetry Analysis - 100% on RA     Records Reviewed: Nursing Notes and Old Medical Records    Provider Notes (Medical Decision Making): UTI/pyelo, vaginitis, stricture    Procedures:  Procedures    ED Course:   2:30 PM  Initial assessment performed. The patients presenting problems have been discussed, and they are in agreement with the care plan formulated and outlined with them. I have encouraged them to ask questions as they arise throughout their visit. 3:05 PM Offered the pt a pelvic exam but she declined. 3:06 PM UA culture form 12/10/18 with [facility] grew a mixed culture. Will await urine culture. No indication for labs or emergent imaging. FU with GYN or PCP. Suspect vaginitis but pt refusing pelvic.  Reasons to RTED discussed with pt. All questions answered. Pt feels comfortable going home at this time. Pt expressed understanding and she agrees with plan. Diagnosis and Disposition       DISCHARGE NOTE:  3:06 PM  Selina Flowers's  results have been reviewed with her. She has been counseled regarding her diagnosis, treatment, and plan. She verbally conveys understanding and agreement of the signs, symptoms, diagnosis, treatment and prognosis and additionally agrees to follow up as discussed. She also agrees with the care-plan and conveys that all of her questions have been answered. I have also provided discharge instructions for her that include: educational information regarding their diagnosis and treatment, and list of reasons why they would want to return to the ED prior to their follow-up appointment, should her condition change. She has been provided with education for proper emergency department utilization. CLINICAL IMPRESSION:    1. Urinary frequency        PLAN:  1. D/C Home  2. Discharge Medication List as of 12/13/2018  3:08 PM        3. Follow-up Information     Follow up With Specialties Details Why Contact Info    Boris Castro MD Family Practice Schedule an appointment as soon as possible for a visit in 2 days For primary care follow up [id number] [address] EMERGENCY DEPT Emergency Medicine Go to As needed, if symptoms worsen [address] [id number]  [phone]        _______________________________    Attestations: This note is prepared by Dalila Sotomayor, acting as Scribe for Vadim Hurst PA-C. Vadim Hurst PA-C:  The scribe's documentation has been prepared under my direction and personally reviewed by me in its entirety.   I confirm that the note above accurately reflects all work, treatment, procedures, and medical decision making performed by me.      ____________________________

## 2018-12-15 LAB
BACTERIA SPEC CULT: NORMAL
SERVICE CMNT-IMP: NORMAL

## 2019-01-21 ENCOUNTER — HOSPITAL ENCOUNTER (EMERGENCY)
Age: 39
Discharge: HOME | End: 2019-01-22
Attending: PHYSICIAN ASSISTANT
Payer: MEDICAID

## 2019-01-21 DIAGNOSIS — Z86.39 HISTORY OF DIABETIC GASTROPARESIS: ICD-10-CM

## 2019-01-21 DIAGNOSIS — R10.13 ABDOMINAL PAIN, EPIGASTRIC: Primary | ICD-10-CM

## 2019-01-21 DIAGNOSIS — K21.9 GASTROESOPHAGEAL REFLUX DISEASE, ESOPHAGITIS PRESENCE NOT SPECIFIED: ICD-10-CM

## 2019-01-21 LAB
ALBUMIN SERPL-MCNC: 3.7 G/DL (ref 3.4–5)
ALBUMIN/GLOB SERPL: 1 {RATIO} (ref 0.8–1.7)
ALP SERPL-CCNC: 75 U/L (ref 45–117)
ALT SERPL-CCNC: 40 U/L (ref 13–56)
ANION GAP SERPL CALC-SCNC: 8 MMOL/L (ref 3–18)
APPEARANCE UR: CLEAR
AST SERPL-CCNC: 22 U/L (ref 15–37)
BASOPHILS # BLD: 0 K/UL (ref 0–0.1)
BASOPHILS NFR BLD: 0 % (ref 0–2)
BILIRUB SERPL-MCNC: 0.3 MG/DL (ref 0.2–1)
BILIRUB UR QL: NEGATIVE
BUN SERPL-MCNC: 8 MG/DL (ref 7–18)
BUN/CREAT SERPL: 10 (ref 12–20)
CALCIUM SERPL-MCNC: 9.4 MG/DL (ref 8.5–10.1)
CHLORIDE SERPL-SCNC: 102 MMOL/L (ref 100–108)
CO2 SERPL-SCNC: 30 MMOL/L (ref 21–32)
COLOR UR: ABNORMAL
CREAT SERPL-MCNC: 0.82 MG/DL (ref 0.6–1.3)
DIFFERENTIAL METHOD BLD: ABNORMAL
EOSINOPHIL # BLD: 0.1 K/UL (ref 0–0.4)
EOSINOPHIL NFR BLD: 1 % (ref 0–5)
ERYTHROCYTE [DISTWIDTH] IN BLOOD BY AUTOMATED COUNT: 13.8 % (ref 11.6–14.5)
GLOBULIN SER CALC-MCNC: 3.8 G/DL (ref 2–4)
GLUCOSE SERPL-MCNC: 135 MG/DL (ref 74–99)
GLUCOSE UR STRIP.AUTO-MCNC: NEGATIVE MG/DL
HCG UR QL: NEGATIVE
HCT VFR BLD AUTO: 38.6 % (ref 35–45)
HGB BLD-MCNC: 12.7 G/DL (ref 12–16)
HGB UR QL STRIP: NEGATIVE
KETONES UR QL STRIP.AUTO: NEGATIVE MG/DL
LEUKOCYTE ESTERASE UR QL STRIP.AUTO: NEGATIVE
LIPASE SERPL-CCNC: 287 U/L (ref 73–393)
LYMPHOCYTES # BLD: 2.5 K/UL (ref 0.9–3.6)
LYMPHOCYTES NFR BLD: 23 % (ref 21–52)
MCH RBC QN AUTO: 27.9 PG (ref 24–34)
MCHC RBC AUTO-ENTMCNC: 32.9 G/DL (ref 31–37)
MCV RBC AUTO: 84.6 FL (ref 74–97)
MONOCYTES # BLD: 0.4 K/UL (ref 0.05–1.2)
MONOCYTES NFR BLD: 4 % (ref 3–10)
NEUTS SEG # BLD: 7.6 K/UL (ref 1.8–8)
NEUTS SEG NFR BLD: 72 % (ref 40–73)
NITRITE UR QL STRIP.AUTO: NEGATIVE
PH UR STRIP: 5.5 [PH] (ref 5–8)
PLATELET # BLD AUTO: 372 K/UL (ref 135–420)
PMV BLD AUTO: 8.9 FL (ref 9.2–11.8)
POTASSIUM SERPL-SCNC: 3.5 MMOL/L (ref 3.5–5.5)
PROT SERPL-MCNC: 7.5 G/DL (ref 6.4–8.2)
PROT UR STRIP-MCNC: NEGATIVE MG/DL
RBC # BLD AUTO: 4.56 M/UL (ref 4.2–5.3)
SODIUM SERPL-SCNC: 140 MMOL/L (ref 136–145)
SP GR UR REFRACTOMETRY: >1.03 (ref 1–1.03)
UROBILINOGEN UR QL STRIP.AUTO: 1 EU/DL (ref 0.2–1)
WBC # BLD AUTO: 10.7 K/UL (ref 4.6–13.2)

## 2019-01-21 PROCEDURE — 99283 EMERGENCY DEPT VISIT LOW MDM: CPT

## 2019-01-21 PROCEDURE — 85025 COMPLETE CBC W/AUTO DIFF WBC: CPT

## 2019-01-21 PROCEDURE — 83690 ASSAY OF LIPASE: CPT

## 2019-01-21 PROCEDURE — 96361 HYDRATE IV INFUSION ADD-ON: CPT

## 2019-01-21 PROCEDURE — 80053 COMPREHEN METABOLIC PANEL: CPT

## 2019-01-21 PROCEDURE — 74011250636 HC RX REV CODE- 250/636: Performed by: EMERGENCY MEDICINE

## 2019-01-21 PROCEDURE — 81025 URINE PREGNANCY TEST: CPT

## 2019-01-21 PROCEDURE — 81003 URINALYSIS AUTO W/O SCOPE: CPT

## 2019-01-21 PROCEDURE — 96375 TX/PRO/DX INJ NEW DRUG ADDON: CPT

## 2019-01-21 PROCEDURE — 74011250636 HC RX REV CODE- 250/636: Performed by: PHYSICIAN ASSISTANT

## 2019-01-21 RX ORDER — FAMOTIDINE 10 MG/ML
20 INJECTION INTRAVENOUS
Status: COMPLETED | OUTPATIENT
Start: 2019-01-21 | End: 2019-01-21

## 2019-01-21 RX ADMIN — FAMOTIDINE 20 MG: 10 INJECTION, SOLUTION INTRAVENOUS at 23:17

## 2019-01-21 RX ADMIN — SODIUM CHLORIDE 1000 ML: 900 INJECTION, SOLUTION INTRAVENOUS at 23:17

## 2019-01-22 ENCOUNTER — APPOINTMENT (OUTPATIENT)
Dept: GENERAL RADIOLOGY | Age: 39
End: 2019-01-22
Attending: PHYSICIAN ASSISTANT
Payer: MEDICAID

## 2019-01-22 VITALS
HEIGHT: 67 IN | DIASTOLIC BLOOD PRESSURE: 76 MMHG | HEART RATE: 76 BPM | TEMPERATURE: 98 F | OXYGEN SATURATION: 100 % | RESPIRATION RATE: 15 BRPM | WEIGHT: 293 LBS | SYSTOLIC BLOOD PRESSURE: 143 MMHG | BODY MASS INDEX: 45.99 KG/M2

## 2019-01-22 PROCEDURE — 74011000258 HC RX REV CODE- 258: Performed by: PHYSICIAN ASSISTANT

## 2019-01-22 PROCEDURE — 74011250636 HC RX REV CODE- 250/636: Performed by: PHYSICIAN ASSISTANT

## 2019-01-22 PROCEDURE — 74011250637 HC RX REV CODE- 250/637: Performed by: PHYSICIAN ASSISTANT

## 2019-01-22 PROCEDURE — 74022 RADEX COMPL AQT ABD SERIES: CPT

## 2019-01-22 PROCEDURE — 96365 THER/PROPH/DIAG IV INF INIT: CPT

## 2019-01-22 RX ORDER — DICYCLOMINE HYDROCHLORIDE 20 MG/1
20 TABLET ORAL EVERY 6 HOURS
Qty: 20 TAB | Refills: 0 | Status: SHIPPED | OUTPATIENT
Start: 2019-01-22 | End: 2019-01-27

## 2019-01-22 RX ORDER — ACETAMINOPHEN 325 MG/1
975 TABLET ORAL
Status: COMPLETED | OUTPATIENT
Start: 2019-01-22 | End: 2019-01-22

## 2019-01-22 RX ADMIN — ACETAMINOPHEN 975 MG: 325 TABLET ORAL at 00:50

## 2019-01-22 RX ADMIN — PROMETHAZINE HYDROCHLORIDE 12.5 MG: 25 INJECTION, SOLUTION INTRAMUSCULAR; INTRAVENOUS at 00:11

## 2019-01-22 NOTE — ED PROVIDER NOTES
EMERGENCY DEPARTMENT HISTORY AND PHYSICAL EXAM 
 
Date: 1/21/2019 Patient Name: Eddie Gastelum History of Presenting Illness Chief Complaint Patient presents with  Abdominal Pain History Provided By: Patient Chief Complaint: upper, constant, throbbing, sharp, abd pain radiating to bilateral flank pain Duration: 10 days ago Timing:  Constant Location: upper abd, upper back, and bilateral flanks Quality: throbbing Associated Symptoms: upper back pain, bilateral flank pain, nausea, fever (99.8F) and yellow stool. Additional History (Context):  
10:47 PM  
Eddie Gastelum is a 41 y.o. female with PMHX of DM, HTN, GI bleed and PSHX of cholecystectomy who presents to the emergency department C/O upper, constant, throbbing, sharp, abd pain radiating to bilateral flank pain onset 10 days ago. Associated sxs include upper back pain, bilateral flank pain, nausea, fever (99.8F) and yellow stool. Pt took Tylenol with no relief. Notes that pain is similar to when she had cholecystitis. Pt denies diarrhea, blood in stool, dysuria, hematuria, urinary frequency, vaginal bleeding, vaginal discharge, heart burn, pregnancy, and any other sxs or complaints. PCP: Kike Palencia MD 
 
Current Outpatient Medications Medication Sig Dispense Refill  dicyclomine (BENTYL) 20 mg tablet Take 1 Tab by mouth every six (6) hours for 20 doses. 20 Tab 0  
 ferrous sulfate (IRON) 325 mg (65 mg iron) tablet Take  by mouth Daily (before breakfast).  losartan-hydroCHLOROthiazide (HYZAAR) 50-12.5 mg per tablet Take 1 Tab by mouth daily.  ascorbic acid, vitamin C, (VITAMIN C) 250 mg tablet Take  by mouth.  cholecalciferol (VITAMIN D3) 1,000 unit cap Take  by mouth daily.  pantoprazole (PROTONIX) 20 mg tablet Take 1 Tab by mouth two (2) times a day. 180 Tab 1  
 glipiZIDE (GLUCOTROL) 10 mg tablet Take 10 mg by mouth two (2) times a day. Past History Past Medical History: Past Medical History:  
Diagnosis Date  Anemia  Diabetes ([facility] [address]  Gastroparesis  GERD (gastroesophageal reflux disease)  HTN (hypertension)  Pancreatitis Past Surgical History: 
Past Surgical History:  
Procedure Laterality Date  HX APPENDECTOMY  HX CHOLECYSTECTOMY  HX HYSTERECTOMY Family History: 
History reviewed. No pertinent family history. Social History: 
Social History Tobacco Use  Smoking status: Never Smoker  Smokeless tobacco: Never Used Substance Use Topics  Alcohol use: No  
 Drug use: No  
 
 
Allergies: Allergies Allergen Reactions  Bactrim [Sulfamethoprim] Hives Itching, hives  Effexor [Venlafaxine] Rash Review of Systems Review of Systems Constitutional: Positive for fever (99.8F). Negative for chills. Cardiovascular: (-) heart burn Gastrointestinal: Positive for abdominal pain (upper, constant, throbbing, sharp, radiating to bilateral flank pain), nausea and vomiting. Negative for blood in stool, constipation and diarrhea. (+) yellow stool Genitourinary: Positive for flank pain (bilateral). Negative for decreased urine volume, dysuria, frequency (urinary), hematuria, pelvic pain, urgency, vaginal bleeding and vaginal discharge. Musculoskeletal: Positive for back pain (upper). All other systems reviewed and are negative. Physical Exam  
 
Vitals:  
 01/21/19 2236 01/22/19 2580 BP: 122/83 143/76 Pulse: 94 76 Resp: 16 15 Temp: 98 °F (36.7 °C) 98 °F (36.7 °C) SpO2: 100% 100% Weight: 133.8 kg (295 lb) Height: 5' 7\" (1.702 m) Physical Exam  
Constitutional: She is oriented to person, place, and time. Vital signs are normal. She does not appear ill. No distress. Obese Eyes: Conjunctivae are normal. Pupils are equal, round, and reactive to light. No scleral icterus. Neck: Neck supple. Cardiovascular: Normal rate, regular rhythm and normal heart sounds. Pulmonary/Chest: Effort normal and breath sounds normal.  
Abdominal: Soft. Bowel sounds are normal. She exhibits no distension. There is no hepatosplenomegaly. There is tenderness in the right upper quadrant and epigastric area. There is CVA tenderness. There is no rigidity, no rebound, no guarding, no tenderness at McBurney's point and negative Crockett's sign. No hernia. Neurological: She is alert and oriented to person, place, and time. Skin: Skin is warm and dry. Psychiatric: She has a normal mood and affect. Her behavior is normal.  
Nursing note and vitals reviewed. Diagnostic Study Results Labs - Recent Results (from the past 12 hour(s)) URINALYSIS W/ RFLX MICROSCOPIC Collection Time: 01/21/19 10:45 PM  
Result Value Ref Range Color DARK YELLOW Appearance CLEAR Specific gravity >1.030 (H) 1.005 - 1.030  
 pH (UA) 5.5 5.0 - 8.0 Protein NEGATIVE  NEG mg/dL Glucose NEGATIVE  NEG mg/dL Ketone NEGATIVE  NEG mg/dL Bilirubin NEGATIVE  NEG Blood NEGATIVE  NEG Urobilinogen 1.0 0.2 - 1.0 EU/dL Nitrites NEGATIVE  NEG Leukocyte Esterase NEGATIVE  NEG    
HCG URINE, QL Collection Time: 01/21/19 10:45 PM  
Result Value Ref Range HCG urine, QL NEGATIVE  NEG    
CBC WITH AUTOMATED DIFF Collection Time: 01/21/19 11:00 PM  
Result Value Ref Range WBC 10.7 4.6 - 13.2 K/uL  
 RBC 4.56 4.20 - 5.30 M/uL  
 HGB 12.7 12.0 - 16.0 g/dL HCT 38.6 35.0 - 45.0 % MCV 84.6 74.0 - 97.0 FL  
 MCH 27.9 24.0 - 34.0 PG  
 MCHC 32.9 31.0 - 37.0 g/dL  
 RDW 13.8 11.6 - 14.5 % PLATELET 905 005 - 785 K/uL MPV 8.9 (L) 9.2 - 11.8 FL  
 NEUTROPHILS 72 40 - 73 % LYMPHOCYTES 23 21 - 52 % MONOCYTES 4 3 - 10 % EOSINOPHILS 1 0 - 5 % BASOPHILS 0 0 - 2 %  
 ABS. NEUTROPHILS 7.6 1.8 - 8.0 K/UL  
 ABS. LYMPHOCYTES 2.5 0.9 - 3.6 K/UL  
 ABS. MONOCYTES 0.4 0.05 - 1.2 K/UL  
 ABS. EOSINOPHILS 0.1 0.0 - 0.4 K/UL  
 ABS. BASOPHILS 0.0 0.0 - 0.1 K/UL DF AUTOMATED METABOLIC PANEL, COMPREHENSIVE Collection Time: 01/21/19 11:00 PM  
Result Value Ref Range Sodium 140 136 - 145 mmol/L Potassium 3.5 3.5 - 5.5 mmol/L Chloride 102 100 - 108 mmol/L  
 CO2 30 21 - 32 mmol/L Anion gap 8 3.0 - 18 mmol/L Glucose 135 (H) 74 - 99 mg/dL BUN 8 7.0 - 18 MG/DL Creatinine 0.82 0.6 - 1.3 MG/DL  
 BUN/Creatinine ratio 10 (L) 12 - 20 GFR est AA >60 >60 ml/min/1.73m2 GFR est non-AA >60 >60 ml/min/1.73m2 Calcium 9.4 8.5 - 10.1 MG/DL Bilirubin, total 0.3 0.2 - 1.0 MG/DL  
 ALT (SGPT) 40 13 - 56 U/L  
 AST (SGOT) 22 15 - 37 U/L Alk. phosphatase 75 45 - 117 U/L Protein, total 7.5 6.4 - 8.2 g/dL Albumin 3.7 3.4 - 5.0 g/dL Globulin 3.8 2.0 - 4.0 g/dL A-G Ratio 1.0 0.8 - 1.7 LIPASE Collection Time: 01/21/19 11:00 PM  
Result Value Ref Range Lipase 287 73 - 393 U/L Radiologic Studies -  
XR ABD ACUTE W 1 V CHEST Final Result Impression:  
-------------- 1. No evidence of acute cardiopulmonary process. 2.  No evidence of bowel obstruction or perforation. CXR Results  (Last 48 hours) 01/22/19 0054  XR ABD ACUTE W 1 V CHEST Final result Impression:  Impression:  
-------------- 1. No evidence of acute cardiopulmonary process. 2.  No evidence of bowel obstruction or perforation. Narrative:  ---------------------------------------------------------------------------  
<<<<<<<<<           [facility]           >>>>>>>>>   
--------------------------------------------------------------------------- CLINICAL HISTORY: Abdominal pain. COMPARISON EXAMINATIONS: April 13, 2017.  
   
   
---  UPRIGHT CHEST RADIOGRAPH  --- The lungs and pleural spaces are clear. The cardiomediastinal silhouette is stable. No significant osseous  
abnormalities. ---  SUPINE AND UPRIGHT ABDOMINAL FILMS  ---  
   
 No bowel dilatation, free intraperitoneal air, or air fluid levels are  
identified. No significant osseous or soft tissue abnormalities are identified.  
   
   
-------------- Medications given in the ED- Medications  
sodium chloride 0.9 % bolus infusion 1,000 mL (0 mL IntraVENous IV Completed 1/22/19 0052) famotidine (PF) (PEPCID) injection 20 mg (20 mg IntraVENous Given 1/21/19 2317)  
promethazine (PHENERGAN) 12.5 mg in 0.9% sodium chloride 50 mL IVPB (0 mg IntraVENous IV Completed 1/22/19 0052)  
acetaminophen (TYLENOL) tablet 975 mg (975 mg Oral Given 1/22/19 0050) Medical Decision Making I am the first provider for this patient. I reviewed the vital signs, available nursing notes, past medical history, past surgical history, family history and social history. Vital Signs-Reviewed the patient's vital signs. Pulse Oximetry Analysis - 100% on RA Records Reviewed: Nursing Notes and Old Medical Records Provider Notes (Medical Decision Making): 42 y/o female here with c/o abdominal pain, N/V, flank pains for 10 days. Associated with N/V. Afebrile. Examination here benign. Very mild pain on exam. Not a surgical concern. Pain mostly in epigastric area. Differential includes gastritis, PUD, pancreatitis, colitis. Already s/p tomás, appy, hysterectomy. Labs negative here. Patient reported increasing pain. Ordered AAS, phenergan for nausea. Tylenol for pain. Record review showed patient at [facility] ER 1 week ago for same. Evaulation there negative as well. Procedures: 
Procedures ED Course:  
10:47 PM Initial assessment performed. The patients presenting problems have been discussed, and they are in agreement with the care plan formulated and outlined with them. I have encouraged them to ask questions as they arise throughout their visit. 11:51 PM Pt notes that pain is getting worse. I will give her Phenergan for nausea and order abd XR. 1:38 AM RUDOLPH Pettit reviewed labs and diagnostic with pt. Encouraged GI f/u given past history significant for GERD, Gastroparesis, IBS, and chronic abd pain. Pt is obese AA F with 10 days of abd pain. Already evaluated in another local ED with no findings or specific dx. On my assessment pt appears in NAD, with stable vitals, normal diagnostics, and benign abd exam. Plan for d/c home with GI f/u. Diagnosis and Disposition DISCHARGE NOTE: 
1:38 AM  
Selina Flowers's  results have been reviewed with her. She has been counseled regarding her diagnosis, treatment, and plan. She verbally conveys understanding and agreement of the signs, symptoms, diagnosis, treatment and prognosis and additionally agrees to follow up as discussed. She also agrees with the care-plan and conveys that all of her questions have been answered. I have also provided discharge instructions for her that include: educational information regarding their diagnosis and treatment, and list of reasons why they would want to return to the ED prior to their follow-up appointment, should her condition change. She has been provided with education for proper emergency department utilization. CLINICAL IMPRESSION: 
 
1. Abdominal pain, epigastric 2. Gastroesophageal reflux disease, esophagitis presence not specified 3. History of diabetic gastroparesis PLAN: 
1. D/C Home 2. Current Discharge Medication List  
  
START taking these medications Details  
dicyclomine (BENTYL) 20 mg tablet Take 1 Tab by mouth every six (6) hours for 20 doses. Qty: 20 Tab, Refills: 0  
  
  
 
3. Follow-up Information Follow up With Specialties Details Why Contact Belinda Nava MD Family Practice Schedule an appointment as soon as possible for a visit  [address] [place] [id number] [phone] 
  
 your GI doctor  Go to  THE [facility] EMERGENCY DEPT Emergency Medicine  As needed, If symptoms worsen [address] [id number] 
[phone]  
  
 
_______________________________ Attestations: This note is prepared by Sharon, acting as Scribe for TATYANA Ramirez MPAS. TATYANA Ramirez MPAS:  The scribe's documentation has been prepared under my direction and personally reviewed by me in its entirety. I confirm that the note above accurately reflects all work, treatment, procedures, and medical decision making performed by me. 
_______________________________

## 2019-01-22 NOTE — ED TRIAGE NOTES
Pt arrives with n/v/d/ and abd pain x 10 days, pt is able to make needs known speaking in complete sentences, pt in nad at this time

## 2019-01-22 NOTE — ED NOTES
Verbal shift change report given to Layla Jaimes (oncoming nurse) by Supriya Mcgee (offgoing nurse). Report included the following information SBAR.

## 2019-01-22 NOTE — DISCHARGE INSTRUCTIONS

## 2019-07-07 ENCOUNTER — APPOINTMENT (OUTPATIENT)
Dept: GENERAL RADIOLOGY | Age: 39
End: 2019-07-07
Attending: EMERGENCY MEDICINE
Payer: MEDICAID

## 2019-07-07 ENCOUNTER — APPOINTMENT (OUTPATIENT)
Dept: CT IMAGING | Age: 39
End: 2019-07-07
Attending: EMERGENCY MEDICINE
Payer: MEDICAID

## 2019-07-07 ENCOUNTER — HOSPITAL ENCOUNTER (EMERGENCY)
Age: 39
Discharge: HOME | End: 2019-07-07
Attending: EMERGENCY MEDICINE
Payer: MEDICAID

## 2019-07-07 VITALS
RESPIRATION RATE: 16 BRPM | TEMPERATURE: 98.3 F | HEIGHT: 67 IN | SYSTOLIC BLOOD PRESSURE: 129 MMHG | HEART RATE: 71 BPM | DIASTOLIC BLOOD PRESSURE: 68 MMHG | OXYGEN SATURATION: 100 % | WEIGHT: 293 LBS | BODY MASS INDEX: 45.99 KG/M2

## 2019-07-07 DIAGNOSIS — E11.65 TYPE 2 DIABETES MELLITUS WITH HYPERGLYCEMIA, UNSPECIFIED WHETHER LONG TERM INSULIN USE (HCC): ICD-10-CM

## 2019-07-07 DIAGNOSIS — R10.9 LEFT SIDED ABDOMINAL PAIN: Primary | ICD-10-CM

## 2019-07-07 DIAGNOSIS — K58.1 IRRITABLE BOWEL SYNDROME WITH CONSTIPATION: ICD-10-CM

## 2019-07-07 LAB
ALBUMIN SERPL-MCNC: 3.8 G/DL (ref 3.4–5)
ALBUMIN/GLOB SERPL: 0.9 {RATIO} (ref 0.8–1.7)
ALP SERPL-CCNC: 80 U/L (ref 45–117)
ALT SERPL-CCNC: 30 U/L (ref 13–56)
ANION GAP SERPL CALC-SCNC: 6 MMOL/L (ref 3–18)
APPEARANCE UR: CLEAR
AST SERPL-CCNC: 13 U/L (ref 15–37)
BASOPHILS # BLD: 0 K/UL (ref 0–0.1)
BASOPHILS NFR BLD: 0 % (ref 0–2)
BILIRUB SERPL-MCNC: 0.3 MG/DL (ref 0.2–1)
BILIRUB UR QL: NEGATIVE
BUN SERPL-MCNC: 14 MG/DL (ref 7–18)
BUN/CREAT SERPL: 16 (ref 12–20)
CALCIUM SERPL-MCNC: 9.7 MG/DL (ref 8.5–10.1)
CHLORIDE SERPL-SCNC: 102 MMOL/L (ref 100–108)
CK MB CFR SERPL CALC: NORMAL % (ref 0–4)
CK MB SERPL-MCNC: <1 NG/ML (ref 5–25)
CK SERPL-CCNC: 76 U/L (ref 26–192)
CO2 SERPL-SCNC: 28 MMOL/L (ref 21–32)
COLOR UR: YELLOW
CREAT SERPL-MCNC: 0.86 MG/DL (ref 0.6–1.3)
DIFFERENTIAL METHOD BLD: ABNORMAL
EOSINOPHIL # BLD: 0 K/UL (ref 0–0.4)
EOSINOPHIL NFR BLD: 0 % (ref 0–5)
ERYTHROCYTE [DISTWIDTH] IN BLOOD BY AUTOMATED COUNT: 14.3 % (ref 11.6–14.5)
GLOBULIN SER CALC-MCNC: 4.2 G/DL (ref 2–4)
GLUCOSE BLD STRIP.AUTO-MCNC: 178 MG/DL (ref 70–110)
GLUCOSE SERPL-MCNC: 176 MG/DL (ref 74–99)
GLUCOSE UR STRIP.AUTO-MCNC: NEGATIVE MG/DL
HCG UR QL: NEGATIVE
HCT VFR BLD AUTO: 40.2 % (ref 35–45)
HGB BLD-MCNC: 13.6 G/DL (ref 12–16)
HGB UR QL STRIP: NEGATIVE
KETONES UR QL STRIP.AUTO: NEGATIVE MG/DL
LEUKOCYTE ESTERASE UR QL STRIP.AUTO: NEGATIVE
LYMPHOCYTES # BLD: 2.5 K/UL (ref 0.9–3.6)
LYMPHOCYTES NFR BLD: 12 % (ref 21–52)
MCH RBC QN AUTO: 27.9 PG (ref 24–34)
MCHC RBC AUTO-ENTMCNC: 33.8 G/DL (ref 31–37)
MCV RBC AUTO: 82.5 FL (ref 74–97)
MONOCYTES # BLD: 0.9 K/UL (ref 0.05–1.2)
MONOCYTES NFR BLD: 4 % (ref 3–10)
NEUTS SEG # BLD: 18.2 K/UL (ref 1.8–8)
NEUTS SEG NFR BLD: 84 % (ref 40–73)
NITRITE UR QL STRIP.AUTO: NEGATIVE
PH UR STRIP: 6 [PH] (ref 5–8)
PLATELET # BLD AUTO: 419 K/UL (ref 135–420)
PMV BLD AUTO: 9.1 FL (ref 9.2–11.8)
POTASSIUM SERPL-SCNC: 3.7 MMOL/L (ref 3.5–5.5)
PROT SERPL-MCNC: 8 G/DL (ref 6.4–8.2)
PROT UR STRIP-MCNC: NEGATIVE MG/DL
RBC # BLD AUTO: 4.87 M/UL (ref 4.2–5.3)
SODIUM SERPL-SCNC: 136 MMOL/L (ref 136–145)
SP GR UR REFRACTOMETRY: 1.02 (ref 1–1.03)
TROPONIN I SERPL-MCNC: <0.02 NG/ML (ref 0–0.04)
UROBILINOGEN UR QL STRIP.AUTO: 1 EU/DL (ref 0.2–1)
WBC # BLD AUTO: 21.6 K/UL (ref 4.6–13.2)

## 2019-07-07 PROCEDURE — 81025 URINE PREGNANCY TEST: CPT

## 2019-07-07 PROCEDURE — 85025 COMPLETE CBC W/AUTO DIFF WBC: CPT

## 2019-07-07 PROCEDURE — 74177 CT ABD & PELVIS W/CONTRAST: CPT

## 2019-07-07 PROCEDURE — 99284 EMERGENCY DEPT VISIT MOD MDM: CPT

## 2019-07-07 PROCEDURE — 80053 COMPREHEN METABOLIC PANEL: CPT

## 2019-07-07 PROCEDURE — 81003 URINALYSIS AUTO W/O SCOPE: CPT

## 2019-07-07 PROCEDURE — 74011636320 HC RX REV CODE- 636/320: Performed by: EMERGENCY MEDICINE

## 2019-07-07 PROCEDURE — 82962 GLUCOSE BLOOD TEST: CPT

## 2019-07-07 PROCEDURE — 71045 X-RAY EXAM CHEST 1 VIEW: CPT

## 2019-07-07 PROCEDURE — 82550 ASSAY OF CK (CPK): CPT

## 2019-07-07 RX ORDER — METOCLOPRAMIDE 10 MG/1
10 TABLET ORAL
Qty: 20 TAB | Refills: 0 | Status: SHIPPED | OUTPATIENT
Start: 2019-07-07 | End: 2019-07-17

## 2019-07-07 RX ORDER — DICYCLOMINE HYDROCHLORIDE 20 MG/1
20 TABLET ORAL EVERY 6 HOURS
Qty: 20 TAB | Refills: 0 | Status: SHIPPED | OUTPATIENT
Start: 2019-07-07 | End: 2019-07-12

## 2019-07-07 RX ADMIN — IOPAMIDOL 100 ML: 612 INJECTION, SOLUTION INTRAVENOUS at 02:25

## 2019-07-07 NOTE — DISCHARGE INSTRUCTIONS
Patient Education        Type 2 Diabetes: Care Instructions  Your Care Instructions    Type 2 diabetes is a disease that develops when the body's tissues cannot use insulin properly. Over time, the pancreas cannot make enough insulin. Insulin is a hormone that helps the body's cells use sugar (glucose) for energy. It also helps the body store extra sugar in muscle, fat, and liver cells. Without insulin, the sugar cannot get into the cells to do its work. It stays in the blood instead. This can cause high blood sugar levels. A person has diabetes when the blood sugar stays too high too much of the time. Over time, diabetes can lead to diseases of the heart, blood vessels, nerves, kidneys, and eyes. You may be able to control your blood sugar by losing weight, eating a healthy diet, and getting daily exercise. You may also have to take insulin or other diabetes medicine. Follow-up care is a key part of your treatment and safety. Be sure to make and go to all appointments. Call your doctor if you are having problems. It's also a good idea to know your test results and keep a list of the medicines you take. How can you care for yourself at home? · Keep your blood sugar at a target level (which you set with your doctor). ? Eat a good diet that spreads carbohydrate throughout the day. Carbohydrate--the body's main source of fuel--affects blood sugar more than any other nutrient. Carbohydrate is in fruits, vegetables, milk, and yogurt. It also is in breads, cereals, vegetables such as potatoes and corn, and sugary foods such as candy and cakes. ? Aim for 30 minutes of exercise on most, preferably all, days of the week. Walking is a good choice. You also may want to do other activities, such as running, swimming, cycling, or playing tennis or team sports. If your doctor says it's okay, do muscle-strengthening exercises at least 2 times a week. ? Take your medicines exactly as prescribed.  Call your doctor if you think you are having a problem with your medicine. You will get more details on the specific medicines your doctor prescribes. · Check your blood sugar as often as your doctor recommends. It is important to keep track of any symptoms you have, such as low blood sugar. Also tell your doctor if you have any changes in your activities, diet, or insulin use. · Talk to your doctor before you start taking aspirin every day. Aspirin can help certain people lower their risk of a heart attack or stroke. But taking aspirin isn't right for everyone, because it can cause serious bleeding. · Do not smoke. If you need help quitting, talk to your doctor about stop-smoking programs and medicines. These can increase your chances of quitting for good. · Keep your cholesterol and blood pressure at normal levels. You may need to take one or more medicines to reach your goals. Take them exactly as directed. Do not stop or change a medicine without talking to your doctor first.  When should you call for help? Call 911 anytime you think you may need emergency care. For example, call if:    · You passed out (lost consciousness), or you suddenly become very sleepy or confused. (You may have very low blood sugar.)    Call your doctor now or seek immediate medical care if:    · Your blood sugar is 300 mg/dL or is higher than the level your doctor has set for you.     · You have symptoms of low blood sugar, such as:  ? Sweating. ? Feeling nervous, shaky, and weak. ? Extreme hunger and slight nausea. ? Dizziness and headache.  ? Blurred vision. ? Confusion.    Watch closely for changes in your health, and be sure to contact your doctor if:    · You often have problems controlling your blood sugar.     · You have symptoms of long-term diabetes problems, such as:  ? New vision changes. ? New pain, numbness, or tingling in your hands or feet. ? Skin problems. Where can you learn more?   Go to http://bambi-carmelina.info/. Enter C553 in the search box to learn more about \"Type 2 Diabetes: Care Instructions. \"  Current as of: July 25, 2018  Content Version: 11.9  © 0176-9303 [employer] Care instructions adapted under license by [employer] (which disclaims liability or warranty for this information). If you have questions about a medical condition or this instruction, always ask your healthcare professional. [place] [address] any warranty or liability for your use of this information. Abdominal Pain: Care Instructions  Your Care Instructions    Abdominal pain has many possible causes. Some aren't serious and get better on their own in a few days. Others need more testing and treatment. If your pain continues or gets worse, you need to be rechecked and may need more tests to find out what is wrong. You may need surgery to correct the problem. Don't ignore new symptoms, such as fever, nausea and vomiting, urination problems, pain that gets worse, and dizziness. These may be signs of a more serious problem. Your doctor may have recommended a follow-up visit in the next 8 to 12 hours. If you are not getting better, you may need more tests or treatment. The doctor has checked you carefully, but problems can develop later. If you notice any problems or new symptoms, get medical treatment right away. Follow-up care is a key part of your treatment and safety. Be sure to make and go to all appointments, and call your doctor if you are having problems. It's also a good idea to know your test results and keep a list of the medicines you take. How can you care for yourself at home? · Rest until you feel better. · To prevent dehydration, drink plenty of fluids, enough so that your urine is light yellow or clear like water. Choose water and other caffeine-free clear liquids until you feel better.  If you have kidney, heart, or liver disease and have to limit fluids, talk with your doctor before you increase the amount of fluids you drink. · If your stomach is upset, eat mild foods, such as rice, dry toast or crackers, bananas, and applesauce. Try eating several small meals instead of two or three large ones. · Wait until 48 hours after all symptoms have gone away before you have spicy foods, alcohol, and drinks that contain caffeine. · Do not eat foods that are high in fat. · Avoid anti-inflammatory medicines such as aspirin, ibuprofen (Advil, Motrin), and naproxen (Aleve). These can cause stomach upset. Talk to your doctor if you take daily aspirin for another health problem. When should you call for help? Call 911 anytime you think you may need emergency care. For example, call if:    · You passed out (lost consciousness).     · You pass maroon or very bloody stools.     · You vomit blood or what looks like coffee grounds.     · You have new, severe belly pain.    Call your doctor now or seek immediate medical care if:    · Your pain gets worse, especially if it becomes focused in one area of your belly.     · You have a new or higher fever.     · Your stools are black and look like tar, or they have streaks of blood.     · You have unexpected vaginal bleeding.     · You have symptoms of a urinary tract infection. These may include:  ? Pain when you urinate. ? Urinating more often than usual.  ? Blood in your urine.     · You are dizzy or lightheaded, or you feel like you may faint.    Watch closely for changes in your health, and be sure to contact your doctor if:    · You are not getting better after 1 day (24 hours). Where can you learn more? Go to http://bambi-carmelina.info/. Enter X866 in the search box to learn more about \"Abdominal Pain: Care Instructions. \"  Current as of: September 23, 2018  Content Version: 11.9  © 0886-7360 [employer]  Care instructions adapted under license by [employer] (which disclaims liability or warranty for this information). If you have questions about a medical condition or this instruction, always ask your healthcare professional. [address] any warranty or liability for your use of this information.

## 2019-07-07 NOTE — ED PROVIDER NOTES
EMERGENCY DEPARTMENT HISTORY AND PHYSICAL EXAM    Date: 7/7/2019  Patient Name: Soledad Vuong    History of Presenting Illness     Chief Complaint   Patient presents with    Flank Pain         History Provided By: Patient    Additional History (Context):     1:58 AM    Soledad Vuong is a 41 y.o. female with pertinent PMHx of cholecystectomy, appendectomy, hysterectomy, GERD, pancreatitis, DM, gastroparesis, and HTN presenting ambulatory to the ED c/o intermittent 6/10 left flank pain x ~1 week. Pt states that the episodes last all day long. Pt notes associated symptoms of generalized myalgias, nausea, chills, dizziness, and lightheadedness. Pt states that she has noted high blood sugars at home. Pt denies any recent medication changes. Pt denies any recent hx of PNA or lung issues. Pt notes a FHx of HTN and cardiac disease (but denies premature cardiac disease). Pt specifically denies any urinary sxs, vomiting, fevers, sinus issues, dental issues, or diarrhea. PCP: Roma Jerome MD  Pt does not smoke tobacco, drink EtOH excessively, or do illicit drugs. There are no other complaints, changes, or physical findings at this time. Current Outpatient Medications   Medication Sig Dispense Refill    ferrous sulfate (IRON) 325 mg (65 mg iron) tablet Take  by mouth Daily (before breakfast).  losartan-hydroCHLOROthiazide (HYZAAR) 50-12.5 mg per tablet Take 1 Tab by mouth daily.  ascorbic acid, vitamin C, (VITAMIN C) 250 mg tablet Take  by mouth.  cholecalciferol (VITAMIN D3) 1,000 unit cap Take  by mouth daily.  pantoprazole (PROTONIX) 20 mg tablet Take 1 Tab by mouth two (2) times a day. 180 Tab 1    glipiZIDE (GLUCOTROL) 10 mg tablet Take 10 mg by mouth two (2) times a day.          Past History     Past Medical History:  Past Medical History:   Diagnosis Date    Anemia     Diabetes ([address]     Gastroparesis     GERD (gastroesophageal reflux disease)     HTN (hypertension)     Pancreatitis        Past Surgical History:  Past Surgical History:   Procedure Laterality Date    HX APPENDECTOMY      HX CHOLECYSTECTOMY      HX HYSTERECTOMY         Family History:  History reviewed. No pertinent family history. Social History:  Social History     Tobacco Use    Smoking status: Never Smoker    Smokeless tobacco: Never Used   Substance Use Topics    Alcohol use: No    Drug use: No       Allergies: Allergies   Allergen Reactions    Bactrim [Sulfamethoprim] Hives     Itching, hives    Effexor [Venlafaxine] Rash         Review of Systems   Review of Systems   Constitutional: Positive for chills. Negative for fever. HENT: Negative for congestion, dental problem, nosebleeds, postnasal drip and rhinorrhea. Gastrointestinal: Positive for nausea. Genitourinary: Positive for flank pain (left). Negative for dysuria, hematuria and urgency. Musculoskeletal: Positive for myalgias (generalized). Neurological: Positive for dizziness and light-headedness. All other systems reviewed and are negative. Physical Exam     Vitals:    07/07/19 0105   BP: 159/90   Pulse: 84   Resp: 14   Temp: 98.3 °F (36.8 °C)   SpO2: 100%   Weight: 142.9 kg (315 lb)   Height: 5' 7\" (1.702 m)     Physical Exam   Constitutional: She is oriented to person, place, and time. She appears well-developed. Morbidly obese   HENT:   Head: Normocephalic and atraumatic. Right Ear: External ear normal.   Left Ear: External ear normal.   Mouth/Throat: Oropharynx is clear and moist. No oropharyngeal exudate. Eyes: Pupils are equal, round, and reactive to light. Conjunctivae and EOM are normal. No scleral icterus. No pallor   Neck: Normal range of motion. Neck supple. No JVD present. No tracheal deviation present. No thyromegaly present. Cardiovascular: Normal rate, regular rhythm and normal heart sounds. Pulmonary/Chest: Effort normal and breath sounds normal. No stridor. No respiratory distress.    Abdominal: Soft. Bowel sounds are normal. She exhibits no distension. There is no rebound and no guarding. Musculoskeletal: Normal range of motion. She exhibits no edema or tenderness. No soft tissue injuries   Lymphadenopathy:     She has no cervical adenopathy. Neurological: She is alert and oriented to person, place, and time. She has normal reflexes. No cranial nerve deficit. Coordination normal.   Skin: Skin is warm and dry. No rash noted. She is not diaphoretic. No erythema. Psychiatric: She has a normal mood and affect. Her behavior is normal. Judgment and thought content normal.   Nursing note and vitals reviewed. Diagnostic Study Results     Labs -     Recent Results (from the past 12 hour(s))   URINALYSIS W/ RFLX MICROSCOPIC    Collection Time: 07/07/19  1:10 AM   Result Value Ref Range    Color YELLOW      Appearance CLEAR      Specific gravity 1.021 1.005 - 1.030      pH (UA) 6.0 5.0 - 8.0      Protein NEGATIVE  NEG mg/dL    Glucose NEGATIVE  NEG mg/dL    Ketone NEGATIVE  NEG mg/dL    Bilirubin NEGATIVE  NEG      Blood NEGATIVE  NEG      Urobilinogen 1.0 0.2 - 1.0 EU/dL    Nitrites NEGATIVE  NEG      Leukocyte Esterase NEGATIVE  NEG     HCG URINE, QL    Collection Time: 07/07/19  1:10 AM   Result Value Ref Range    HCG urine, QL NEGATIVE  NEG     GLUCOSE, POC    Collection Time: 07/07/19  1:12 AM   Result Value Ref Range    Glucose (POC) 178 (H) 70 - 110 mg/dL   CBC WITH AUTOMATED DIFF    Collection Time: 07/07/19  1:15 AM   Result Value Ref Range    WBC 21.6 (H) 4.6 - 13.2 K/uL    RBC 4.87 4.20 - 5.30 M/uL    HGB 13.6 12.0 - 16.0 g/dL    HCT 40.2 35.0 - 45.0 %    MCV 82.5 74.0 - 97.0 FL    MCH 27.9 24.0 - 34.0 PG    MCHC 33.8 31.0 - 37.0 g/dL    RDW 14.3 11.6 - 14.5 %    PLATELET 405 419 - 998 K/uL    MPV 9.1 (L) 9.2 - 11.8 FL    NEUTROPHILS 84 (H) 40 - 73 %    LYMPHOCYTES 12 (L) 21 - 52 %    MONOCYTES 4 3 - 10 %    EOSINOPHILS 0 0 - 5 %    BASOPHILS 0 0 - 2 %    ABS.  NEUTROPHILS 18.2 (H) 1.8 - 8.0 K/UL ABS. LYMPHOCYTES 2.5 0.9 - 3.6 K/UL    ABS. MONOCYTES 0.9 0.05 - 1.2 K/UL    ABS. EOSINOPHILS 0.0 0.0 - 0.4 K/UL    ABS. BASOPHILS 0.0 0.0 - 0.1 K/UL    DF AUTOMATED     METABOLIC PANEL, COMPREHENSIVE    Collection Time: 07/07/19  1:15 AM   Result Value Ref Range    Sodium 136 136 - 145 mmol/L    Potassium 3.7 3.5 - 5.5 mmol/L    Chloride 102 100 - 108 mmol/L    CO2 28 21 - 32 mmol/L    Anion gap 6 3.0 - 18 mmol/L    Glucose 176 (H) 74 - 99 mg/dL    BUN 14 7.0 - 18 MG/DL    Creatinine 0.86 0.6 - 1.3 MG/DL    BUN/Creatinine ratio 16 12 - 20      GFR est AA >60 >60 ml/min/1.73m2    GFR est non-AA >60 >60 ml/min/1.73m2    Calcium 9.7 8.5 - 10.1 MG/DL    Bilirubin, total 0.3 0.2 - 1.0 MG/DL    ALT (SGPT) 30 13 - 56 U/L    AST (SGOT) 13 (L) 15 - 37 U/L    Alk. phosphatase 80 45 - 117 U/L    Protein, total 8.0 6.4 - 8.2 g/dL    Albumin 3.8 3.4 - 5.0 g/dL    Globulin 4.2 (H) 2.0 - 4.0 g/dL    A-G Ratio 0.9 0.8 - 1.7     CARDIAC PANEL,(CK, CKMB & TROPONIN)    Collection Time: 07/07/19  1:15 AM   Result Value Ref Range    CK 76 26 - 192 U/L    CK - MB <1.0 <3.6 ng/ml    CK-MB Index  0.0 - 4.0 %     CALCULATION NOT PERFORMED WHEN RESULT IS BELOW LINEAR LIMIT    Troponin-I, QT <0.02 0.0 - 0.045 NG/ML       Radiologic Studies -   CT ABD PELV W CONT   Final Result   IMPRESSION:      Retained stool throughout. No acute intra-abdominal process identified. XR CHEST PORT    (Results Pending)     CT Results  (Last 48 hours)               07/07/19 0235  CT ABD PELV W CONT Final result    Impression:  IMPRESSION:       Retained stool throughout. No acute intra-abdominal process identified. Narrative:  EXAM: CT of the abdomen and pelvis       INDICATION: Pain. COMPARISON: None. TECHNIQUE: Axial CT imaging of the abdomen and pelvis was performed with   intravenous contrast. Multiplanar reformats were generated.  Dose reduction   techniques used: automated exposure control, adjustment of the mAs and/or kVp   according to patient size, and iterative reconstruction techniques. _______________       FINDINGS:       LOWER CHEST: Unremarkable. LIVER, BILIARY: Liver is normal. No biliary dilation. There has been a prior   cholecystectomy. PANCREAS: Normal.       SPLEEN: Normal.       ADRENALS: Normal.       KIDNEYS: Normal.       LYMPH NODES: No enlarged lymph nodes. GASTROINTESTINAL TRACT: No bowel dilation or wall thickening. There is retained   stool throughout. There is a small hiatal hernia. PELVIC ORGANS: Unremarkable. VASCULATURE: Unremarkable. BONES: No acute or aggressive osseous abnormalities identified. OTHER: None.       _______________               4:09 AM  RADIOLOGY FINDINGS  Chest X-ray shows borderline cardiomegaly, with some perihilar vascular congestion but no teddy pulmonary edema  Pending review by Radiologist  Recorded by Toya Mason ED Scribe, as dictated by Brooklyn Smith. Jose Ibanez MD.     Medical Decision Making   I am the first provider for this patient. I reviewed the vital signs, available nursing notes, past medical history, past surgical history, family history and social history. Vital Signs-Reviewed the patient's vital signs. Pulse Oximetry Analysis - 100% on RA     Records Reviewed: Nursing Notes and Old Medical Records    Provider Notes (Medical Decision Making): DDx: altered sugar with flank pain and body aches, suggestive of kidney infection, stone. Pregnancy impossible in this case. Ovarian pathology is possible, but we do not know her ovarian or hormone status due to her unknown surgical hx. Gastrointestinal issues including diverticulitis, colitis, or colon CA are unlikely. Given her elevated WBC and sxs, will scan abdomen and image for structural pathology including PNA.     PROCEDURES:  Procedures    MEDICATIONS GIVEN IN THE ED:  Medications   iopamidol (ISOVUE 300) 61 % contrast injection 100 mL (100 mL IntraVENous Given 7/7/19 0225)        ED COURSE:   1:58 AM   Initial assessment performed. PROGRESS NOTE:  4:19 AM  Pt and/or family have been updated on their results. Pt and/or pt's family are aware of the plan of care and are in agreement. Written by AMANDA Pérezibe, as dictated by Adolfo Peace MD.      Diagnosis and Disposition       DISCHARGE NOTE:  4:22 AM  The patient is ready for discharge. The patient's signs, symptoms, diagnosis, and discharge instructions have been discussed and the patient and/or family has conveyed their understanding. The patient and/or family is to follow up as recommended or return to the ER should their symptoms worsen. Plan has been discussed and the patient and/or family is in agreement. Written by AMANDA Pérez, as dictated by Adolfo Peace MD.     CLINICAL IMPRESSION:  1. Left sided abdominal pain    2. Type 2 diabetes mellitus with hyperglycemia, unspecified whether long term insulin use ([facility] [address]    3. Irritable bowel syndrome with constipation        PLAN:  1. D/C Home  2. Current Discharge Medication List      START taking these medications    Details   dicyclomine (BENTYL) 20 mg tablet Take 1 Tab by mouth every six (6) hours for 20 doses. Qty: 20 Tab, Refills: 0      metoclopramide HCl (REGLAN) 10 mg tablet Take 1 Tab by mouth every six (6) hours as needed (abdominal pain or bnausea or vomiting) for up to 10 days. Qty: 20 Tab, Refills: 0         CONTINUE these medications which have NOT CHANGED    Details   ferrous sulfate (IRON) 325 mg (65 mg iron) tablet Take  by mouth Daily (before breakfast). losartan-hydroCHLOROthiazide (HYZAAR) 50-12.5 mg per tablet Take 1 Tab by mouth daily. cholecalciferol (VITAMIN D3) 1,000 unit cap Take  by mouth daily. pantoprazole (PROTONIX) 20 mg tablet Take 1 Tab by mouth two (2) times a day. Qty: 180 Tab, Refills: 1      glipiZIDE (GLUCOTROL) 10 mg tablet Take 10 mg by mouth two (2) times a day.            3. Follow-up Information     Follow up With Specialties Details Why Contact Info    Matt Martines MD Family Practice Schedule an appointment as soon as possible for a visit for primary care [place] up [address] [place]      [facility] EMERGENCY DEPT Emergency Medicine  As needed, If symptoms worsen [address] [place] [id number]  [phone]        _______________________________    Attestations: This note is prepared by Qi Ayoub, acting as Scribe for Rose Marie Coe. Lubna Grimaldo MD.    Rose Marie Coe. Lubna Grimaldo MD:  The scribe's documentation has been prepared under my direction and personally reviewed by me in its entirety.  I confirm that the note above accurately reflects all work, treatment, procedures, and medical decision making performed by me.  _______________________________

## 2019-07-07 NOTE — ED TRIAGE NOTES
Pt arrives ambulatory to ED with c\o left flank pain and inability to get BGL under 350, pt is able to make needs known speaking in complete sentences, pt in nad at this time

## 2021-03-09 NOTE — ED NOTES
I have reviewed discharge instructions with the patient. The patient verbalized understanding.   Patient armband removed and shredded Detail Level: Detailed X Size Of Lesion In Cm (Optional): 0

## (undated) DEVICE — MAJ-1414 SINGLE USE ADPATER BIOPSY VALV: Brand: SINGLE USE ADAPTOR BIOPSY VALVE

## (undated) DEVICE — ENDO CARRY-ON PROCEDURE KIT INCLUDES ENZYMATIC SPONGE, GAUZE, BIOHAZARD LABEL, TRAY, LUBRICANT, DIRTY SCOPE LABEL, WATER LABEL, TRAY, DRAWSTRING PAD, AND DEFENDO 4-PIECE KIT.: Brand: ENDO CARRY-ON PROCEDURE KIT

## (undated) DEVICE — REM POLYHESIVE ADULT PATIENT RETURN ELECTRODE: Brand: VALLEYLAB

## (undated) DEVICE — MEDI-VAC NON-CONDUCTIVE SUCTION TUBING: Brand: CARDINAL HEALTH

## (undated) DEVICE — TRAP SPEC COLL POLYP POLYSTYR --

## (undated) DEVICE — SINGLE PORT MANIFOLD: Brand: NEPTUNE 2

## (undated) DEVICE — MOUTHPIECE ENDOSCP 20X27MM --

## (undated) DEVICE — KENDALL RADIOLUCENT FOAM MONITORING ELECTRODE RECTANGULAR SHAPE: Brand: KENDALL

## (undated) DEVICE — SPONGE GZ W4XL4IN COT 12 PLY TYP VII WVN C FLD DSGN